# Patient Record
Sex: FEMALE | Race: BLACK OR AFRICAN AMERICAN | Employment: OTHER | URBAN - METROPOLITAN AREA
[De-identification: names, ages, dates, MRNs, and addresses within clinical notes are randomized per-mention and may not be internally consistent; named-entity substitution may affect disease eponyms.]

---

## 2017-08-16 ENCOUNTER — OFFICE VISIT (OUTPATIENT)
Dept: FAMILY MEDICINE CLINIC | Age: 57
End: 2017-08-16

## 2017-08-16 VITALS
WEIGHT: 131.5 LBS | BODY MASS INDEX: 25.82 KG/M2 | SYSTOLIC BLOOD PRESSURE: 110 MMHG | TEMPERATURE: 98.1 F | HEART RATE: 76 BPM | HEIGHT: 60 IN | OXYGEN SATURATION: 98 % | DIASTOLIC BLOOD PRESSURE: 80 MMHG | RESPIRATION RATE: 14 BRPM

## 2017-08-16 DIAGNOSIS — Z12.4 CERVICAL CANCER SCREENING: ICD-10-CM

## 2017-08-16 DIAGNOSIS — Z23 NEED FOR 23-POLYVALENT PNEUMOCOCCAL POLYSACCHARIDE VACCINE: ICD-10-CM

## 2017-08-16 DIAGNOSIS — Z12.11 COLON CANCER SCREENING: ICD-10-CM

## 2017-08-16 DIAGNOSIS — Z13.31 DEPRESSION SCREENING: ICD-10-CM

## 2017-08-16 DIAGNOSIS — Z11.59 NEED FOR HEPATITIS C SCREENING TEST: ICD-10-CM

## 2017-08-16 DIAGNOSIS — E04.1 THYROID NODULE: ICD-10-CM

## 2017-08-16 DIAGNOSIS — E78.5 HYPERLIPIDEMIA, UNSPECIFIED HYPERLIPIDEMIA TYPE: Primary | ICD-10-CM

## 2017-08-16 DIAGNOSIS — Z23 NEED FOR TDAP VACCINATION: ICD-10-CM

## 2017-08-16 DIAGNOSIS — Z12.39 BREAST CANCER SCREENING: ICD-10-CM

## 2017-08-16 DIAGNOSIS — Z13.1 SCREENING FOR DIABETES MELLITUS (DM): ICD-10-CM

## 2017-08-16 PROCEDURE — 99214 OFFICE O/P EST MOD 30 MIN: CPT | Performed by: FAMILY MEDICINE

## 2017-08-16 PROCEDURE — G0444 DEPRESSION SCREEN ANNUAL: HCPCS | Performed by: FAMILY MEDICINE

## 2017-08-16 RX ORDER — ALBUTEROL SULFATE 2.5 MG/3ML
2.5 SOLUTION RESPIRATORY (INHALATION) EVERY 6 HOURS PRN
COMMUNITY
End: 2017-11-06 | Stop reason: SDUPTHER

## 2017-08-16 RX ORDER — SIMVASTATIN 40 MG
40 TABLET ORAL EVERY EVENING
Qty: 30 TABLET | Refills: 3 | Status: SHIPPED | OUTPATIENT
Start: 2017-08-16 | End: 2017-11-06 | Stop reason: SDUPTHER

## 2017-08-16 ASSESSMENT — ENCOUNTER SYMPTOMS
EYE ITCHING: 0
CONSTIPATION: 0
FACIAL SWELLING: 0
EYE DISCHARGE: 0
EYE PAIN: 0
EYE REDNESS: 0
BLOOD IN STOOL: 0
CHOKING: 0
ABDOMINAL PAIN: 0
TROUBLE SWALLOWING: 0
ANAL BLEEDING: 0
RHINORRHEA: 0
PHOTOPHOBIA: 0
WHEEZING: 0
COUGH: 1
ABDOMINAL DISTENTION: 0
CHEST TIGHTNESS: 0
APNEA: 0
NAUSEA: 0
BACK PAIN: 0
DIARRHEA: 0
SINUS PRESSURE: 0
SHORTNESS OF BREATH: 0
COLOR CHANGE: 0

## 2017-08-16 ASSESSMENT — PATIENT HEALTH QUESTIONNAIRE - PHQ9
SUM OF ALL RESPONSES TO PHQ9 QUESTIONS 1 & 2: 0
2. FEELING DOWN, DEPRESSED OR HOPELESS: 0
SUM OF ALL RESPONSES TO PHQ QUESTIONS 1-9: 0
1. LITTLE INTEREST OR PLEASURE IN DOING THINGS: 0

## 2017-08-31 ENCOUNTER — OFFICE VISIT (OUTPATIENT)
Dept: GASTROENTEROLOGY | Age: 57
End: 2017-08-31

## 2017-08-31 VITALS
SYSTOLIC BLOOD PRESSURE: 156 MMHG | HEART RATE: 69 BPM | DIASTOLIC BLOOD PRESSURE: 94 MMHG | WEIGHT: 134 LBS | BODY MASS INDEX: 26.17 KG/M2

## 2017-08-31 DIAGNOSIS — Z86.010 PERSONAL HISTORY OF COLONIC POLYPS: Primary | ICD-10-CM

## 2017-08-31 DIAGNOSIS — Z80.0 FAMILY HISTORY OF COLON CANCER: ICD-10-CM

## 2017-08-31 PROCEDURE — PREOPEXAM PRE-OP EXAM: Performed by: INTERNAL MEDICINE

## 2017-09-27 ENCOUNTER — HOSPITAL ENCOUNTER (OUTPATIENT)
Dept: WOMENS IMAGING | Age: 57
Discharge: HOME | End: 2017-09-27
Payer: MEDICAID

## 2017-09-27 DIAGNOSIS — Z12.39 BREAST CANCER SCREENING: ICD-10-CM

## 2017-09-27 PROCEDURE — G0202 SCR MAMMO BI INCL CAD: HCPCS

## 2017-10-03 ENCOUNTER — OFFICE VISIT (OUTPATIENT)
Dept: SURGERY | Age: 57
End: 2017-10-03

## 2017-10-03 VITALS
DIASTOLIC BLOOD PRESSURE: 84 MMHG | BODY MASS INDEX: 26.31 KG/M2 | WEIGHT: 134 LBS | HEART RATE: 76 BPM | SYSTOLIC BLOOD PRESSURE: 132 MMHG | HEIGHT: 60 IN

## 2017-10-03 DIAGNOSIS — E04.9 GOITER: Primary | ICD-10-CM

## 2017-10-03 PROCEDURE — 99213 OFFICE O/P EST LOW 20 MIN: CPT | Performed by: INTERNAL MEDICINE

## 2017-10-11 NOTE — PROGRESS NOTES
Subjective:      Patient ID: Raul Barcenas is a 65 y.o. female. Other   This is a chronic (goiter /left thyroid nodule ) problem. The current episode started more than 1 year ago. The problem has been unchanged. Nothing aggravates the symptoms. Lab Results   Component Value Date    TSH 0.550 08/28/2016    W0RLFRY 0.98 01/20/2014         Reviewed thyroid u/s   EXAMINATION:  ULTRASOUND THYROID   CLINICAL HISTORY:  FOLLOWUP THYROID NODULE   COMPARISONS:  1/28/15   TECHNIQUE:  Grayscale with duplex Doppler sonography. Sonographic imaging was performed by a registered sonographer and the images are submitted for interpretation. FINDINGS:   The right lobe measures 1.7 x 1.8 x 5.2 cm. There are a few tiny cysts in the right lobe. The left lobe measures 1.5 x 1.5 x 4.6 cm. The isthmus measures 0.35 cm. There is an isoechoic 6 x 9 x 14 mm nodule in the left isthmus. This is considered a low    suspicion nodule according to American Thyroid Association guidelines, and is below the cut off size for FNA. There is no significant change from prior examination.       Impression   STABLE LOW SUSPICION NODULE IN THE LEFT ISTHMUS. NO CHANGE 1/28/15   Reference: 2015 American Thyroid Association Management Guidelines for Adult Patients with Thyroid Nodules and Differentiated Thyroid Cancer: The American Thyroid Associated Guidelines Task Force on Thyroid Nodules and Differentiated Thyroid Cancer   Gemma Asher et al., Thyroid.  Jan 2016, 26(1):1-133     Patient Active Problem List   Diagnosis    Chronic low back pain    Hyperlipidemia    COPD (chronic obstructive pulmonary disease) (HCC)    Insomnia    Vitamin D deficiency    Nephrolithiasis    Colon polyps    Cervicalgia    Anxiety and depression    Thyroid nodule    Lumbar radicular pain    Chronic scapular pain    Cervical spine arthritis (HCC)    Neck pain    Chronic right shoulder pain           Review of Systems   HENT: Negative for trouble swallowing. Endocrine: Negative. Vitals:    10/03/17 1405   BP: 132/84   Site: Left Arm   Position: Sitting   Cuff Size: Medium Adult   Pulse: 76   Weight: 134 lb (60.8 kg)   Height: 5' (1.524 m)       Objective:   Physical Exam   Constitutional: She appears well-developed and well-nourished. HENT:   Head: Normocephalic and atraumatic. Neck: Thyromegaly present. Cardiovascular: Normal rate. Pulmonary/Chest: Effort normal.   Musculoskeletal: Normal range of motion. Neurological: She is alert. Skin: Skin is warm and dry. Psychiatric: She has a normal mood and affect. Assessment:      1.  Goiter  T4, Free    TSH without Reflex    US HEAD NECK SOFT TISSUE THYROID           Plan:       F/u in 6-12 months   Orders Placed This Encounter   Procedures    US HEAD NECK SOFT TISSUE THYROID     Standing Status:   Future     Standing Expiration Date:   10/3/2018     Order Specific Question:   Reason for exam:     Answer:   thyroid nodule    T4, Free     Standing Status:   Future     Standing Expiration Date:   10/3/2018    TSH without Reflex     Standing Status:   Future     Standing Expiration Date:   10/3/2018

## 2017-10-12 ASSESSMENT — ENCOUNTER SYMPTOMS: TROUBLE SWALLOWING: 0

## 2017-11-03 ENCOUNTER — HOSPITAL ENCOUNTER (OUTPATIENT)
Dept: ULTRASOUND IMAGING | Age: 57
Discharge: HOME | End: 2017-11-03
Payer: MEDICAID

## 2017-11-03 DIAGNOSIS — E04.9 GOITER: ICD-10-CM

## 2017-11-03 DIAGNOSIS — Z11.59 NEED FOR HEPATITIS C SCREENING TEST: ICD-10-CM

## 2017-11-03 DIAGNOSIS — Z13.1 SCREENING FOR DIABETES MELLITUS (DM): ICD-10-CM

## 2017-11-03 DIAGNOSIS — E78.5 HYPERLIPIDEMIA, UNSPECIFIED HYPERLIPIDEMIA TYPE: ICD-10-CM

## 2017-11-03 LAB
ALBUMIN SERPL-MCNC: 4.4 G/DL (ref 3.9–4.9)
ALP BLD-CCNC: 91 U/L (ref 40–130)
ALT SERPL-CCNC: 8 U/L (ref 0–33)
ANION GAP SERPL CALCULATED.3IONS-SCNC: 14 MEQ/L (ref 7–13)
AST SERPL-CCNC: 15 U/L (ref 0–35)
ATYPICAL LYMPHOCYTE RELATIVE PERCENT: 4 %
BASOPHILS ABSOLUTE: 0.1 K/UL (ref 0–0.2)
BASOPHILS RELATIVE PERCENT: 1 %
BILIRUB SERPL-MCNC: 0.2 MG/DL (ref 0–1.2)
BUN BLDV-MCNC: 8 MG/DL (ref 6–20)
CALCIUM SERPL-MCNC: 9.3 MG/DL (ref 8.6–10.2)
CHLORIDE BLD-SCNC: 103 MEQ/L (ref 98–107)
CHOLESTEROL, TOTAL: 276 MG/DL (ref 0–199)
CO2: 27 MEQ/L (ref 22–29)
CREAT SERPL-MCNC: 0.58 MG/DL (ref 0.5–0.9)
EOSINOPHILS ABSOLUTE: 0 K/UL (ref 0–0.7)
EOSINOPHILS RELATIVE PERCENT: 0.5 %
GFR AFRICAN AMERICAN: >60
GFR NON-AFRICAN AMERICAN: >60
GLOBULIN: 3 G/DL (ref 2.3–3.5)
GLUCOSE BLD-MCNC: 80 MG/DL (ref 74–109)
HBA1C MFR BLD: 5.8 % (ref 4.8–5.9)
HCT VFR BLD CALC: 42.6 % (ref 37–47)
HDLC SERPL-MCNC: 66 MG/DL (ref 40–59)
HEMOGLOBIN: 13.8 G/DL (ref 12–16)
HEPATITIS C ANTIBODY INTERPRETATION: NORMAL
LDL CHOLESTEROL CALCULATED: 196 MG/DL (ref 0–129)
LYMPHOCYTES ABSOLUTE: 2 K/UL (ref 1–4.8)
LYMPHOCYTES RELATIVE PERCENT: 29 %
MCH RBC QN AUTO: 31 PG (ref 27–31.3)
MCHC RBC AUTO-ENTMCNC: 32.5 % (ref 33–37)
MCV RBC AUTO: 95.4 FL (ref 82–100)
MONOCYTES ABSOLUTE: 0.9 K/UL (ref 0.2–0.8)
MONOCYTES RELATIVE PERCENT: 14.6 %
NEUTROPHILS ABSOLUTE: 3.1 K/UL (ref 1.4–6.5)
NEUTROPHILS RELATIVE PERCENT: 51 %
PDW BLD-RTO: 13.9 % (ref 11.5–14.5)
PLATELET # BLD: 248 K/UL (ref 130–400)
PLATELET SLIDE REVIEW: NORMAL
POTASSIUM SERPL-SCNC: 4.7 MEQ/L (ref 3.5–5.1)
RBC # BLD: 4.46 M/UL (ref 4.2–5.4)
RBC # BLD: NORMAL 10*6/UL
SODIUM BLD-SCNC: 144 MEQ/L (ref 132–144)
T4 FREE: 1.28 NG/DL (ref 0.93–1.7)
TOTAL PROTEIN: 7.4 G/DL (ref 6.4–8.1)
TRIGL SERPL-MCNC: 68 MG/DL (ref 0–200)
TSH SERPL DL<=0.05 MIU/L-ACNC: 0.39 UIU/ML (ref 0.27–4.2)
VACUOLATED NEUTROPHILS: PRESENT
WBC # BLD: 6 K/UL (ref 4.8–10.8)

## 2017-11-03 PROCEDURE — 76536 US EXAM OF HEAD AND NECK: CPT

## 2017-11-06 ENCOUNTER — OFFICE VISIT (OUTPATIENT)
Dept: FAMILY MEDICINE CLINIC | Age: 57
End: 2017-11-06

## 2017-11-06 VITALS
SYSTOLIC BLOOD PRESSURE: 118 MMHG | BODY MASS INDEX: 25.11 KG/M2 | HEIGHT: 61 IN | TEMPERATURE: 98.6 F | WEIGHT: 133 LBS | HEART RATE: 74 BPM | DIASTOLIC BLOOD PRESSURE: 74 MMHG | RESPIRATION RATE: 20 BRPM | OXYGEN SATURATION: 98 %

## 2017-11-06 DIAGNOSIS — Z72.0 TOBACCO ABUSE: ICD-10-CM

## 2017-11-06 DIAGNOSIS — E78.5 HYPERLIPIDEMIA, UNSPECIFIED HYPERLIPIDEMIA TYPE: Primary | ICD-10-CM

## 2017-11-06 DIAGNOSIS — R73.03 PREDIABETES: ICD-10-CM

## 2017-11-06 DIAGNOSIS — J45.909 REACTIVE AIRWAY DISEASE WITHOUT COMPLICATION, UNSPECIFIED ASTHMA SEVERITY, UNSPECIFIED WHETHER PERSISTENT: ICD-10-CM

## 2017-11-06 PROCEDURE — 3014F SCREEN MAMMO DOC REV: CPT | Performed by: FAMILY MEDICINE

## 2017-11-06 PROCEDURE — G8419 CALC BMI OUT NRM PARAM NOF/U: HCPCS | Performed by: FAMILY MEDICINE

## 2017-11-06 PROCEDURE — G8484 FLU IMMUNIZE NO ADMIN: HCPCS | Performed by: FAMILY MEDICINE

## 2017-11-06 PROCEDURE — G8427 DOCREV CUR MEDS BY ELIG CLIN: HCPCS | Performed by: FAMILY MEDICINE

## 2017-11-06 PROCEDURE — 3017F COLORECTAL CA SCREEN DOC REV: CPT | Performed by: FAMILY MEDICINE

## 2017-11-06 PROCEDURE — 99214 OFFICE O/P EST MOD 30 MIN: CPT | Performed by: FAMILY MEDICINE

## 2017-11-06 PROCEDURE — 4004F PT TOBACCO SCREEN RCVD TLK: CPT | Performed by: FAMILY MEDICINE

## 2017-11-06 RX ORDER — ALPRAZOLAM 0.5 MG/1
TABLET ORAL
Refills: 0 | Status: CANCELLED | OUTPATIENT
Start: 2017-11-06

## 2017-11-06 RX ORDER — ALBUTEROL SULFATE 2.5 MG/3ML
2.5 SOLUTION RESPIRATORY (INHALATION) EVERY 6 HOURS PRN
Qty: 60 EACH | Refills: 0 | Status: SHIPPED | OUTPATIENT
Start: 2017-11-06 | End: 2018-07-24 | Stop reason: SDUPTHER

## 2017-11-06 RX ORDER — VARENICLINE TARTRATE 25 MG
KIT ORAL
Qty: 1 EACH | Refills: 0 | Status: SHIPPED | OUTPATIENT
Start: 2017-11-06 | End: 2018-07-11

## 2017-11-06 RX ORDER — SIMVASTATIN 40 MG
40 TABLET ORAL EVERY EVENING
Qty: 30 TABLET | Refills: 3 | Status: SHIPPED | OUTPATIENT
Start: 2017-11-06 | End: 2018-07-11 | Stop reason: SDUPTHER

## 2017-11-06 ASSESSMENT — ENCOUNTER SYMPTOMS
ABDOMINAL PAIN: 0
NAUSEA: 0
DIARRHEA: 0
CONSTIPATION: 0
CHEST TIGHTNESS: 0
APNEA: 0
COUGH: 0
VOMITING: 0
BLOOD IN STOOL: 0
SHORTNESS OF BREATH: 0

## 2018-01-25 ENCOUNTER — TELEPHONE (OUTPATIENT)
Dept: FAMILY MEDICINE CLINIC | Age: 58
End: 2018-01-25

## 2018-01-25 NOTE — TELEPHONE ENCOUNTER
Pt called and wants us to know that she wants to get the lotion from a company that we get a fax from. She has used and gotten from them before.

## 2018-07-11 ENCOUNTER — OFFICE VISIT (OUTPATIENT)
Dept: FAMILY MEDICINE CLINIC | Age: 58
End: 2018-07-11
Payer: MEDICAID

## 2018-07-11 VITALS
TEMPERATURE: 97.7 F | HEART RATE: 73 BPM | WEIGHT: 127.25 LBS | BODY MASS INDEX: 24.02 KG/M2 | DIASTOLIC BLOOD PRESSURE: 72 MMHG | OXYGEN SATURATION: 97 % | HEIGHT: 61 IN | SYSTOLIC BLOOD PRESSURE: 112 MMHG | RESPIRATION RATE: 14 BRPM

## 2018-07-11 DIAGNOSIS — Z12.11 COLON CANCER SCREENING: ICD-10-CM

## 2018-07-11 DIAGNOSIS — E78.5 HYPERLIPIDEMIA, UNSPECIFIED HYPERLIPIDEMIA TYPE: Primary | ICD-10-CM

## 2018-07-11 DIAGNOSIS — J44.9 CHRONIC OBSTRUCTIVE PULMONARY DISEASE, UNSPECIFIED COPD TYPE (HCC): ICD-10-CM

## 2018-07-11 DIAGNOSIS — Z12.4 CERVICAL CANCER SCREENING: ICD-10-CM

## 2018-07-11 DIAGNOSIS — Z23 NEED FOR VACCINATION: ICD-10-CM

## 2018-07-11 DIAGNOSIS — Z12.39 BREAST CANCER SCREENING: ICD-10-CM

## 2018-07-11 PROCEDURE — G8420 CALC BMI NORM PARAMETERS: HCPCS | Performed by: FAMILY MEDICINE

## 2018-07-11 PROCEDURE — G8427 DOCREV CUR MEDS BY ELIG CLIN: HCPCS | Performed by: FAMILY MEDICINE

## 2018-07-11 PROCEDURE — 99213 OFFICE O/P EST LOW 20 MIN: CPT | Performed by: FAMILY MEDICINE

## 2018-07-11 PROCEDURE — 4004F PT TOBACCO SCREEN RCVD TLK: CPT | Performed by: FAMILY MEDICINE

## 2018-07-11 PROCEDURE — G8926 SPIRO NO PERF OR DOC: HCPCS | Performed by: FAMILY MEDICINE

## 2018-07-11 PROCEDURE — 3017F COLORECTAL CA SCREEN DOC REV: CPT | Performed by: FAMILY MEDICINE

## 2018-07-11 PROCEDURE — 3023F SPIROM DOC REV: CPT | Performed by: FAMILY MEDICINE

## 2018-07-11 RX ORDER — SIMVASTATIN 40 MG
40 TABLET ORAL EVERY EVENING
Qty: 30 TABLET | Refills: 5 | Status: SHIPPED | OUTPATIENT
Start: 2018-07-11 | End: 2022-03-09

## 2018-07-15 ASSESSMENT — ENCOUNTER SYMPTOMS
SHORTNESS OF BREATH: 0
RHINORRHEA: 0
ABDOMINAL PAIN: 0
VOMITING: 0
COUGH: 0
SORE THROAT: 0
TROUBLE SWALLOWING: 0
NAUSEA: 0
HEARTBURN: 0
CHEST TIGHTNESS: 0
BLOOD IN STOOL: 0
CONSTIPATION: 0
APNEA: 0
DIARRHEA: 0

## 2018-07-16 NOTE — PROGRESS NOTES
disease, unspecified COPD type ([address]  Due to smoking history likely lung issue is a combination of both asthma and COPD  Continue albuterol as needed  Consider pulmonary function tests at later date    5. Breast cancer screening    - JENNYFER DIGITAL SCREEN W CAD BILATERAL; Future    6. Cervical cancer screening    - Ambulatory referral to Obstetrics / Gynecology      Return in about 3 months (around 10/11/2018).     Ilya Adler MD

## 2018-07-24 DIAGNOSIS — J45.909 REACTIVE AIRWAY DISEASE WITHOUT COMPLICATION, UNSPECIFIED ASTHMA SEVERITY, UNSPECIFIED WHETHER PERSISTENT: ICD-10-CM

## 2018-07-24 RX ORDER — ALBUTEROL SULFATE 2.5 MG/3ML
2.5 SOLUTION RESPIRATORY (INHALATION) EVERY 6 HOURS PRN
Qty: 60 EACH | Refills: 0 | Status: SHIPPED | OUTPATIENT
Start: 2018-07-24 | End: 2018-09-18 | Stop reason: ALTCHOICE

## 2018-07-24 RX ORDER — VARENICLINE TARTRATE 0.5 MG/1
0.5 TABLET, FILM COATED ORAL 2 TIMES DAILY
Qty: 60 TABLET | Refills: 3 | OUTPATIENT
Start: 2018-07-24

## 2018-07-24 NOTE — TELEPHONE ENCOUNTER
Chantix was discontinued at her most recent office visit in the past month. She should discuss this with Dr. Lavone Shone at her next appointment or call again to request refill when he returns to the office.  He would know why the medication was discontinued

## 2018-07-24 NOTE — TELEPHONE ENCOUNTER
Chantix was discontinued at her most recent office visit in the past month. She should discuss this with Dr. Robert Rock at her next appointment.

## 2018-07-25 ENCOUNTER — OFFICE VISIT (OUTPATIENT)
Dept: OBGYN CLINIC | Age: 58
End: 2018-07-25
Payer: MEDICAID

## 2018-07-25 VITALS
HEART RATE: 68 BPM | DIASTOLIC BLOOD PRESSURE: 64 MMHG | WEIGHT: 127 LBS | BODY MASS INDEX: 23.98 KG/M2 | HEIGHT: 61 IN | SYSTOLIC BLOOD PRESSURE: 122 MMHG

## 2018-07-25 DIAGNOSIS — B37.31 VAGINAL YEAST INFECTION: ICD-10-CM

## 2018-07-25 DIAGNOSIS — Z01.419 VISIT FOR GYNECOLOGIC EXAMINATION: Primary | ICD-10-CM

## 2018-07-25 PROCEDURE — 99386 PREV VISIT NEW AGE 40-64: CPT | Performed by: OBSTETRICS & GYNECOLOGY

## 2018-07-25 RX ORDER — FLUCONAZOLE 150 MG/1
TABLET ORAL
Qty: 3 TABLET | Refills: 0 | Status: SHIPPED | OUTPATIENT
Start: 2018-07-25 | End: 2018-09-18 | Stop reason: ALTCHOICE

## 2018-07-25 NOTE — PROGRESS NOTES
and atraumatic. Eyes: Conjunctivae are normal. Pupils are equal, round, and reactive to light. Neck: Normal range of motion. Neck supple. Cardiovascular: Normal rate and regular rhythm. Pulmonary/Chest: Effort normal and breath sounds normal. No respiratory distress. Right breast exhibits no inverted nipple, no mass, no nipple discharge, no skin change and no tenderness. Left breast exhibits no inverted nipple, no mass, no nipple discharge, no skin change and no tenderness. Breasts are symmetrical.   Abdominal: Soft. Bowel sounds are normal.   Genitourinary: Vagina normal, uterus normal and cervix normal. No vaginal discharge found. Musculoskeletal: Normal range of motion. Neurological: She is alert and oriented to person, place, and time. She has normal reflexes. Psychiatric: Memory, affect and judgment normal.       Assessment:      Diagnosis Orders   1. Visit for gynecologic examination  PAP SMEAR    Bacterial Vaginosis Panel    Candida DNA probe   2. Vaginal yeast infection         Body mass index is 24 kg/m². Obesity:  Normal weight  Smoking:  Yes    Plan:   indicated:  performed. Fluconazole 150 mg po every 2 days x 3 doses for possible yeast infection. If symptoms recur to follow for possible need of estrogen vaginal cream .     Obesity Counseling:  N/A  Smoking Counseling:  Given  What are the benefits of quitting smoking? -- Quitting smoking can lower your chances of getting or dying from heart disease, lung disease, kidney failure, infection, or cancer. It can also lower your chances of getting osteoporosis, a condition that makes your bones weak. Plus, quitting smoking can help your skin look younger and reduce the chances that you will have problems with sex. Quitting smoking will improve your health no matter how old you are, and no matter how long or how much you have smoked. The letters in the word \"START\" can help you remember the steps to take:  S = Set a quit date.   T = Tell

## 2018-07-31 LAB — PAP SMEAR: NORMAL

## 2018-08-02 DIAGNOSIS — F17.200 TOBACCO DEPENDENCE: Primary | ICD-10-CM

## 2018-08-02 RX ORDER — VARENICLINE TARTRATE 1 MG/1
1 TABLET, FILM COATED ORAL 2 TIMES DAILY
Qty: 60 TABLET | Refills: 3 | Status: SHIPPED | OUTPATIENT
Start: 2018-08-02 | End: 2018-09-18 | Stop reason: ALTCHOICE

## 2018-08-03 ENCOUNTER — HOSPITAL ENCOUNTER (OUTPATIENT)
Dept: WOMENS IMAGING | Age: 58
Discharge: HOME | End: 2018-08-05
Payer: MEDICAID

## 2018-08-03 DIAGNOSIS — Z12.39 BREAST CANCER SCREENING: ICD-10-CM

## 2018-08-08 ENCOUNTER — HOSPITAL ENCOUNTER (EMERGENCY)
Age: 58
Discharge: HOME | End: 2018-08-08
Payer: MEDICAID

## 2018-08-08 ENCOUNTER — APPOINTMENT (OUTPATIENT)
Dept: CT IMAGING | Age: 58
End: 2018-08-08
Payer: MEDICAID

## 2018-08-08 VITALS
DIASTOLIC BLOOD PRESSURE: 93 MMHG | BODY MASS INDEX: 24.74 KG/M2 | SYSTOLIC BLOOD PRESSURE: 132 MMHG | OXYGEN SATURATION: 100 % | HEIGHT: 60 IN | TEMPERATURE: 97.6 F | RESPIRATION RATE: 18 BRPM | HEART RATE: 65 BPM | WEIGHT: 126 LBS

## 2018-08-08 DIAGNOSIS — I95.1 ORTHOSTATIC HYPOTENSION: Primary | ICD-10-CM

## 2018-08-08 DIAGNOSIS — E86.0 DEHYDRATION: ICD-10-CM

## 2018-08-08 LAB
ALBUMIN SERPL-MCNC: 3.6 G/DL (ref 3.9–4.9)
ALP BLD-CCNC: 88 U/L (ref 40–130)
ALT SERPL-CCNC: 10 U/L (ref 0–33)
AMPHETAMINE SCREEN, URINE: ABNORMAL
ANION GAP SERPL CALCULATED.3IONS-SCNC: 13 MEQ/L (ref 7–13)
AST SERPL-CCNC: 18 U/L (ref 0–35)
BARBITURATE SCREEN URINE: ABNORMAL
BASOPHILS ABSOLUTE: 0 K/UL (ref 0–0.2)
BASOPHILS RELATIVE PERCENT: 0.4 %
BENZODIAZEPINE SCREEN, URINE: ABNORMAL
BILIRUB SERPL-MCNC: <0.2 MG/DL (ref 0–1.2)
BILIRUBIN URINE: NEGATIVE
BLOOD, URINE: NEGATIVE
BUN BLDV-MCNC: 10 MG/DL (ref 6–20)
CALCIUM SERPL-MCNC: 9.2 MG/DL (ref 8.6–10.2)
CANNABINOID SCREEN URINE: POSITIVE
CHLORIDE BLD-SCNC: 103 MEQ/L (ref 98–107)
CLARITY: CLEAR
CO2: 24 MEQ/L (ref 22–29)
COCAINE METABOLITE SCREEN URINE: ABNORMAL
COLOR: YELLOW
CREAT SERPL-MCNC: 0.76 MG/DL (ref 0.5–0.9)
EOSINOPHILS ABSOLUTE: 0 K/UL (ref 0–0.7)
EOSINOPHILS RELATIVE PERCENT: 0.5 %
ETHANOL PERCENT: NORMAL G/DL
ETHANOL: <10 MG/DL (ref 0–0.08)
GFR AFRICAN AMERICAN: >60
GFR NON-AFRICAN AMERICAN: >60
GLOBULIN: 3.5 G/DL (ref 2.3–3.5)
GLUCOSE BLD-MCNC: 105 MG/DL (ref 74–109)
GLUCOSE URINE: NEGATIVE MG/DL
HCT VFR BLD CALC: 42.6 % (ref 37–47)
HEMOGLOBIN: 14.6 G/DL (ref 12–16)
KETONES, URINE: NEGATIVE MG/DL
LEUKOCYTE ESTERASE, URINE: NEGATIVE
LYMPHOCYTES ABSOLUTE: 2.3 K/UL (ref 1–4.8)
LYMPHOCYTES RELATIVE PERCENT: 30.1 %
Lab: ABNORMAL
MCH RBC QN AUTO: 32.6 PG (ref 27–31.3)
MCHC RBC AUTO-ENTMCNC: 34.3 % (ref 33–37)
MCV RBC AUTO: 95.2 FL (ref 82–100)
MONOCYTES ABSOLUTE: 0.5 K/UL (ref 0.2–0.8)
MONOCYTES RELATIVE PERCENT: 6.1 %
NEUTROPHILS ABSOLUTE: 4.8 K/UL (ref 1.4–6.5)
NEUTROPHILS RELATIVE PERCENT: 62.9 %
NITRITE, URINE: NEGATIVE
OPIATE SCREEN URINE: ABNORMAL
PDW BLD-RTO: 13.6 % (ref 11.5–14.5)
PH UA: 7 (ref 5–9)
PHENCYCLIDINE SCREEN URINE: ABNORMAL
PLATELET # BLD: 217 K/UL (ref 130–400)
POTASSIUM SERPL-SCNC: 4.4 MEQ/L (ref 3.5–5.1)
PROTEIN UA: NEGATIVE MG/DL
RBC # BLD: 4.48 M/UL (ref 4.2–5.4)
SODIUM BLD-SCNC: 140 MEQ/L (ref 132–144)
SPECIFIC GRAVITY UA: 1 (ref 1–1.03)
TOTAL PROTEIN: 7.1 G/DL (ref 6.4–8.1)
TSH SERPL DL<=0.05 MIU/L-ACNC: 1.52 UIU/ML (ref 0.27–4.2)
URINE REFLEX TO CULTURE: NORMAL
UROBILINOGEN, URINE: 0.2 E.U./DL
WBC # BLD: 7.6 K/UL (ref 4.8–10.8)

## 2018-08-08 PROCEDURE — 70450 CT HEAD/BRAIN W/O DYE: CPT

## 2018-08-08 PROCEDURE — 81003 URINALYSIS AUTO W/O SCOPE: CPT

## 2018-08-08 PROCEDURE — 36415 COLL VENOUS BLD VENIPUNCTURE: CPT

## 2018-08-08 PROCEDURE — 96361 HYDRATE IV INFUSION ADD-ON: CPT

## 2018-08-08 PROCEDURE — 99284 EMERGENCY DEPT VISIT MOD MDM: CPT

## 2018-08-08 PROCEDURE — G0480 DRUG TEST DEF 1-7 CLASSES: HCPCS

## 2018-08-08 PROCEDURE — 96360 HYDRATION IV INFUSION INIT: CPT

## 2018-08-08 PROCEDURE — 85025 COMPLETE CBC W/AUTO DIFF WBC: CPT

## 2018-08-08 PROCEDURE — 2580000003 HC RX 258: Performed by: PHYSICIAN ASSISTANT

## 2018-08-08 PROCEDURE — 84443 ASSAY THYROID STIM HORMONE: CPT

## 2018-08-08 PROCEDURE — 80307 DRUG TEST PRSMV CHEM ANLYZR: CPT

## 2018-08-08 PROCEDURE — 80053 COMPREHEN METABOLIC PANEL: CPT

## 2018-08-08 RX ORDER — 0.9 % SODIUM CHLORIDE 0.9 %
1000 INTRAVENOUS SOLUTION INTRAVENOUS ONCE
Status: COMPLETED | OUTPATIENT
Start: 2018-08-08 | End: 2018-08-08

## 2018-08-08 RX ORDER — 0.9 % SODIUM CHLORIDE 0.9 %
1000 INTRAVENOUS SOLUTION INTRAVENOUS ONCE
Status: DISCONTINUED | OUTPATIENT
Start: 2018-08-08 | End: 2018-08-08 | Stop reason: HOSPADM

## 2018-08-08 RX ADMIN — SODIUM CHLORIDE 1000 ML: 9 INJECTION, SOLUTION INTRAVENOUS at 08:56

## 2018-08-08 RX ADMIN — SODIUM CHLORIDE 1000 ML: 9 INJECTION, SOLUTION INTRAVENOUS at 07:09

## 2018-08-08 ASSESSMENT — ENCOUNTER SYMPTOMS
CONSTIPATION: 0
ABDOMINAL PAIN: 0
RHINORRHEA: 0
COLOR CHANGE: 0
ABDOMINAL DISTENTION: 0
SORE THROAT: 0
EYE DISCHARGE: 0
SHORTNESS OF BREATH: 0

## 2018-08-08 NOTE — ED NOTES
Pt given food and drink and is sitting up in bed eating with no distress noted.       Cailin Shea RN  08/08/18 7246

## 2018-08-08 NOTE — ED PROVIDER NOTES
review of systems was reviewed and negative. PAST MEDICAL HISTORY     Past Medical History:   Diagnosis Date    Anxiety     Cervicalgia     Chronic low back pain     Chronic low back pain     Chronic scapular pain     Colon polyps     Colon polyps     COPD (chronic obstructive pulmonary disease) (HCC)     Hyperlipidemia     Insomnia     Insomnia     Lumbar radicular pain     Major depression     Nephrolithiasis     Nephrolithiasis     Osteoarthritis     Thyroid nodule     Vitamin D deficiency          SURGICAL HISTORY       Past Surgical History:   Procedure Laterality Date     SECTION      COLONOSCOPY  2006    NATY HUNT MD    HYSTERECTOMY           CURRENT MEDICATIONS       Previous Medications    ALBUTEROL (PROVENTIL) (2.5 MG/3ML) 0.083% NEBULIZER SOLUTION    Take 3 mLs by nebulization every 6 hours as needed for Wheezing    FLUCONAZOLE (DIFLUCAN) 150 MG TABLET    Take 1 tablet every 2 days for 3 doses. SIMVASTATIN (ZOCOR) 40 MG TABLET    Take 1 tablet by mouth every evening    VARENICLINE (CHANTIX CONTINUING MONTH MALIKA) 1 MG TABLET    Take 1 tablet by mouth 2 times daily       ALLERGIES     Patient has no known allergies.     FAMILY HISTORY       Family History   Problem Relation Age of Onset    Depression Mother     Heart Disease Mother     Cancer Father         prostate cancer    Cancer Paternal Grandfather         prostate cancer    Cancer Maternal Cousin         breast cancer          SOCIAL HISTORY       Social History     Social History    Marital status: Single     Spouse name: N/A    Number of children: N/A    Years of education: N/A     Social History Main Topics    Smoking status: Current Every Day Smoker     Packs/day: 0.50     Years: 30.00     Types: Cigarettes    Smokeless tobacco: Never Used      Comment: trying to cut back    Alcohol use Yes      Comment: ocassional    Drug use: No    Sexual activity: Not Currently     Other Topics Concern    None     Social History Narrative    None       SCREENINGS             PHYSICAL EXAM    (up to 7 for level 4, 8 or more for level 5)     ED Triage Vitals [08/08/18 0636]   BP Temp Temp Source Pulse Resp SpO2 Height Weight   122/78 97.6 °F (36.4 °C) Oral 65 20 100 % 5' (1.524 m) 126 lb (57.2 kg)       Physical Exam   Constitutional: She is oriented to person, place, and time. She appears well-developed and well-nourished. HENT:   Head: Normocephalic. Eyes: Pupils are equal, round, and reactive to light. Neck: Neck supple. No JVD present. No tracheal deviation present. Cardiovascular: Normal rate. Pulmonary/Chest: Effort normal. No respiratory distress. She has no wheezes. She has no rales. She exhibits no tenderness. Abdominal: Soft. She exhibits no distension and no mass. There is no tenderness. There is no guarding. Musculoskeletal: She exhibits no edema or deformity. Neurological: She is alert and oriented to person, place, and time. Coordination normal.   Patient is able to stand and ambulate without any dizziness, no ataxia, but when I lay her flat she states that she feels like the room is spinning. No speech and facial droop and no weakness upper or lower extremities. Skin: Skin is warm. DIAGNOSTIC RESULTS     EKG: All EKG's are interpreted by the Emergency Department Physician who either signs or Co-signs this chart in the absence of a cardiologist.        RADIOLOGY:   Non-plain film images such as CT, Ultrasound and MRI are read by the radiologist. Plain radiographic images are visualized and preliminarily interpreted by the emergency physician with the below findings:    CT brain shows no acute intracranial process. Interpretation per the Radiologist below, if available at the time of this note:    CT Head WO Contrast   Final Result      No acute intracranial process.             ED BEDSIDE ULTRASOUND:   Performed by ED Physician - none    LABS:  Labs Reviewed   COMPREHENSIVE METABOLIC PANEL - Abnormal; Notable for the following:        Result Value    Alb 3.6 (*)     All other components within normal limits   CBC WITH AUTO DIFFERENTIAL - Abnormal; Notable for the following:     MCH 32.6 (*)     All other components within normal limits   URINE DRUG SCREEN - Abnormal; Notable for the following:     Cannabinoid Scrn, Ur POSITIVE (*)     All other components within normal limits   TSH WITHOUT REFLEX   ETHANOL   URINE RT REFLEX TO CULTURE       All other labs were within normal range or not returned as of this dictation. EMERGENCY DEPARTMENT COURSE and DIFFERENTIAL DIAGNOSIS/MDM:   Vitals:    Vitals:    08/08/18 0636 08/08/18 0744 08/08/18 0948 08/08/18 1017   BP: 122/78  132/88 (!) 132/93   Pulse: 65  56 65   Resp: 20 18 18    Temp: 97.6 °F (36.4 °C)      TempSrc: Oral      SpO2: 100% 100% 100%    Weight: 126 lb (57.2 kg)      Height: 5' (1.524 m)               MDM  Number of Diagnoses or Management Options  Dehydration:   Orthostatic hypotension:   Diagnosis management comments: Pt blood pressure was initially positive during orthostatic testing, pt was hydrated with 2 liters of NS, and orthostatics repeated and where improved. Pt feeling much better and advised to increase oral fluid intake. Pt advised if she has return of symptoms or no improvement to return to the ER. CRITICAL CARE TIME   Total Critical Care time was 0 minutes, excluding separately reportable procedures. There was a high probability of clinically significant/life threatening deterioration in the patient's condition which required my urgent intervention. CONSULTS:  None    PROCEDURES:  Unless otherwise noted below, none     Procedures    FINAL IMPRESSION      1. Orthostatic hypotension    2.  Dehydration          DISPOSITION/PLAN   DISPOSITION Discharge - Pending Orders Complete 08/08/2018 08:47:32 AM      PATIENT REFERRED TO:  Urbano Jean MD  [address] [place] [address],[address] [place] [address] Road  [phone]    In 2 days        DISCHARGE MEDICATIONS:  New Prescriptions    No medications on file          (Please note that portions of this note were completed with a voice recognition program.  Efforts were made to edit the dictations but occasionally words are mis-transcribed.)    Josefina Ayala PA-C (electronically signed)  Attending Emergency Physician         Josefina Ayala PA-C  08/08/18 1038

## 2018-08-24 PROBLEM — Z01.419 VISIT FOR GYNECOLOGIC EXAMINATION: Status: RESOLVED | Noted: 2018-07-25 | Resolved: 2018-08-24

## 2018-09-12 ENCOUNTER — HOSPITAL ENCOUNTER (EMERGENCY)
Age: 58
Discharge: HOME | End: 2018-09-12
Attending: EMERGENCY MEDICINE
Payer: MEDICAID

## 2018-09-12 VITALS
RESPIRATION RATE: 18 BRPM | WEIGHT: 132 LBS | TEMPERATURE: 97.9 F | HEIGHT: 64 IN | OXYGEN SATURATION: 99 % | BODY MASS INDEX: 22.53 KG/M2 | SYSTOLIC BLOOD PRESSURE: 113 MMHG | HEART RATE: 74 BPM | DIASTOLIC BLOOD PRESSURE: 79 MMHG

## 2018-09-12 DIAGNOSIS — J30.2 SEASONAL ALLERGIES: Primary | ICD-10-CM

## 2018-09-12 PROCEDURE — 99282 EMERGENCY DEPT VISIT SF MDM: CPT

## 2018-09-12 RX ORDER — DIPHENHYDRAMINE HCL 25 MG
25 CAPSULE ORAL
Qty: 30 CAPSULE | Refills: 0 | Status: SHIPPED | OUTPATIENT
Start: 2018-09-12 | End: 2018-09-18 | Stop reason: ALTCHOICE

## 2018-09-12 RX ORDER — CETIRIZINE HYDROCHLORIDE 10 MG/1
10 TABLET ORAL DAILY
Qty: 30 TABLET | Refills: 0 | Status: SHIPPED | OUTPATIENT
Start: 2018-09-12 | End: 2018-09-18 | Stop reason: ALTCHOICE

## 2018-09-12 ASSESSMENT — ENCOUNTER SYMPTOMS
GASTROINTESTINAL NEGATIVE: 1
EYES NEGATIVE: 1
RESPIRATORY NEGATIVE: 1

## 2018-09-12 NOTE — ED PROVIDER NOTES
List as of 9/12/2018 12:46 PM      CONTINUE these medications which have NOT CHANGED    Details   varenicline (CHANTIX CONTINUING MONTH MALIKA) 1 MG tablet Take 1 tablet by mouth 2 times daily, Disp-60 tablet, R-3Normal      fluconazole (DIFLUCAN) 150 MG tablet Take 1 tablet every 2 days for 3 doses. , Disp-3 tablet, R-0Normal      albuterol (PROVENTIL) (2.5 MG/3ML) 0.083% nebulizer solution Take 3 mLs by nebulization every 6 hours as needed for Wheezing, Disp-60 each, R-0Normal      simvastatin (ZOCOR) 40 MG tablet Take 1 tablet by mouth every evening, Disp-30 tablet, R-5Normal             ALLERGIES     Patient has no known allergies. FAMILY HISTORY       Family History   Problem Relation Age of Onset    Depression Mother     Heart Disease Mother     Cancer Father         prostate cancer    Cancer Paternal Grandfather         prostate cancer    Cancer Maternal Cousin         breast cancer          SOCIAL HISTORY       Social History     Social History    Marital status: Single     Spouse name: N/A    Number of children: N/A    Years of education: N/A     Social History Main Topics    Smoking status: Current Every Day Smoker     Packs/day: 0.50     Years: 30.00     Types: Cigarettes    Smokeless tobacco: Never Used      Comment: trying to cut back    Alcohol use Yes      Comment: ocassional    Drug use: No    Sexual activity: Not Currently     Other Topics Concern    None     Social History Narrative    None       SCREENINGS             PHYSICAL EXAM    (up to 7 for level 4, 8 or more for level 5)     ED Triage Vitals [09/12/18 1229]   BP Temp Temp Source Pulse Resp SpO2 Height Weight   113/79 97.9 °F (36.6 °C) Oral 74 18 99 % 5' 4\" (1.626 m) 132 lb (59.9 kg)       Physical Exam   Constitutional: She is oriented to person, place, and time. She appears well-developed and well-nourished. No distress. HENT:   Head: Normocephalic and atraumatic. Eyes: Pupils are equal, round, and reactive to light.

## 2018-09-12 NOTE — ED TRIAGE NOTES
Pt c/o facial edema in the morning for the past 4 days, Pt states rhe edema subsides as the day goes on, no edema or redness noted at this time, Pt is A&OX3, calm, ambulatory, afebrile, breathes are equal and unlabored, denies pain, SOB, and dizziness.

## 2018-09-18 ENCOUNTER — OFFICE VISIT (OUTPATIENT)
Dept: FAMILY MEDICINE CLINIC | Age: 58
End: 2018-09-18
Payer: MEDICAID

## 2018-09-18 VITALS
HEIGHT: 60 IN | OXYGEN SATURATION: 99 % | TEMPERATURE: 99.5 F | BODY MASS INDEX: 24.15 KG/M2 | WEIGHT: 123 LBS | DIASTOLIC BLOOD PRESSURE: 72 MMHG | SYSTOLIC BLOOD PRESSURE: 136 MMHG | HEART RATE: 93 BPM | RESPIRATION RATE: 12 BRPM

## 2018-09-18 DIAGNOSIS — R68.89 FLU-LIKE SYMPTOMS: Primary | ICD-10-CM

## 2018-09-18 LAB
INFLUENZA A ANTIBODY: NORMAL
INFLUENZA B ANTIBODY: NORMAL

## 2018-09-18 PROCEDURE — G8427 DOCREV CUR MEDS BY ELIG CLIN: HCPCS | Performed by: FAMILY MEDICINE

## 2018-09-18 PROCEDURE — G8420 CALC BMI NORM PARAMETERS: HCPCS | Performed by: FAMILY MEDICINE

## 2018-09-18 PROCEDURE — 3017F COLORECTAL CA SCREEN DOC REV: CPT | Performed by: FAMILY MEDICINE

## 2018-09-18 PROCEDURE — 87804 INFLUENZA ASSAY W/OPTIC: CPT | Performed by: FAMILY MEDICINE

## 2018-09-18 PROCEDURE — 4004F PT TOBACCO SCREEN RCVD TLK: CPT | Performed by: FAMILY MEDICINE

## 2018-09-18 PROCEDURE — 99214 OFFICE O/P EST MOD 30 MIN: CPT | Performed by: FAMILY MEDICINE

## 2018-09-18 RX ORDER — AZITHROMYCIN 250 MG/1
TABLET, FILM COATED ORAL
Qty: 6 TABLET | Refills: 0 | Status: SHIPPED | OUTPATIENT
Start: 2018-09-18 | End: 2018-09-19

## 2018-09-18 ASSESSMENT — PATIENT HEALTH QUESTIONNAIRE - PHQ9
1. LITTLE INTEREST OR PLEASURE IN DOING THINGS: 0
SUM OF ALL RESPONSES TO PHQ QUESTIONS 1-9: 0
SUM OF ALL RESPONSES TO PHQ QUESTIONS 1-9: 0
SUM OF ALL RESPONSES TO PHQ9 QUESTIONS 1 & 2: 0
2. FEELING DOWN, DEPRESSED OR HOPELESS: 0

## 2018-09-19 ENCOUNTER — HOSPITAL ENCOUNTER (OUTPATIENT)
Dept: GENERAL RADIOLOGY | Age: 58
Discharge: HOME | End: 2018-09-21
Payer: MEDICAID

## 2018-09-19 ENCOUNTER — TELEPHONE (OUTPATIENT)
Dept: FAMILY MEDICINE CLINIC | Age: 58
End: 2018-09-19

## 2018-09-19 DIAGNOSIS — R68.89 FLU-LIKE SYMPTOMS: ICD-10-CM

## 2018-09-19 PROCEDURE — 71046 X-RAY EXAM CHEST 2 VIEWS: CPT

## 2018-09-19 RX ORDER — LEVOFLOXACIN 750 MG/1
750 TABLET ORAL DAILY
Qty: 5 TABLET | Refills: 0 | Status: SHIPPED | OUTPATIENT
Start: 2018-09-19 | End: 2018-09-24

## 2018-09-19 NOTE — TELEPHONE ENCOUNTER
[address] from radiology wanted you to look at the chest x-ray that was done today I see that you have already seen the results. cp

## 2018-09-23 ASSESSMENT — ENCOUNTER SYMPTOMS
APNEA: 0
SINUS PAIN: 0
SHORTNESS OF BREATH: 0
SORE THROAT: 1
CHEST TIGHTNESS: 0
WHEEZING: 0
CONSTIPATION: 0
ABDOMINAL PAIN: 1
VOMITING: 0
NAUSEA: 0
COUGH: 1
BLOOD IN STOOL: 0
RHINORRHEA: 0
SWOLLEN GLANDS: 0
DIARRHEA: 1

## 2018-09-23 NOTE — PROGRESS NOTES
Packs/day: 0.50     Years: 30.00     Types: Cigarettes    Smokeless tobacco: Never Used      Comment: trying to cut back    Alcohol use Yes      Comment: ocassional    Drug use: No    Sexual activity: Not Currently     Other Topics Concern    Not on file     Social History Narrative    No narrative on file     Current Outpatient Prescriptions on File Prior to Visit   Medication Sig Dispense Refill    simvastatin (ZOCOR) 40 MG tablet Take 1 tablet by mouth every evening 30 tablet 5     No current facility-administered medications on file prior to visit. Allergies:  Patient has no known allergies. Review of Systems   Constitutional: Positive for fatigue. Negative for activity change, appetite change and fever. HENT: Positive for congestion and sore throat. Negative for ear pain, rhinorrhea, sinus pain and sneezing. Respiratory: Positive for cough (Productive of clear sputum). Negative for apnea, chest tightness, shortness of breath and wheezing. Cardiovascular: Negative for chest pain, palpitations and leg swelling. Gastrointestinal: Positive for abdominal pain and diarrhea. Negative for blood in stool, constipation, nausea and vomiting. Genitourinary: Negative for dysuria. Musculoskeletal: Negative for arthralgias, joint pain and neck pain. Skin: Negative for rash. Neurological: Positive for headaches. Negative for seizures. Psychiatric/Behavioral: Negative for hallucinations and suicidal ideas. Objective:   /72 (Site: Right Upper Arm, Position: Sitting, Cuff Size: Medium Adult)   Pulse 93   Temp 99.5 °F (37.5 °C) (Temporal)   Resp 12   Ht 5' (1.524 m)   Wt 123 lb (55.8 kg)   LMP  (LMP Unknown)   SpO2 99%   Breastfeeding? No   BMI 24.02 kg/m²     Physical Exam   Constitutional: She is oriented to person, place, and time. She appears well-developed and well-nourished. No distress. HENT:   Head: Normocephalic and atraumatic.    Mouth/Throat: Oropharynx is clear

## 2020-10-18 ENCOUNTER — TELEPHONE (OUTPATIENT)
Dept: FAMILY MEDICINE CLINIC | Age: 60
End: 2020-10-18

## 2020-10-18 NOTE — TELEPHONE ENCOUNTER
Called patient to schedule mammogram and appointment.  She is no longer Dr. Kristel Garland patient, she goes to [facility]

## 2022-02-18 ENCOUNTER — HOSPITAL ENCOUNTER (EMERGENCY)
Age: 62
Discharge: HOME | End: 2022-02-18
Payer: MEDICARE

## 2022-02-18 VITALS
WEIGHT: 133 LBS | RESPIRATION RATE: 16 BRPM | SYSTOLIC BLOOD PRESSURE: 164 MMHG | TEMPERATURE: 98.7 F | BODY MASS INDEX: 26.11 KG/M2 | HEART RATE: 68 BPM | HEIGHT: 60 IN | OXYGEN SATURATION: 97 % | DIASTOLIC BLOOD PRESSURE: 81 MMHG

## 2022-02-18 DIAGNOSIS — R31.9 URINARY TRACT INFECTION WITH HEMATURIA, SITE UNSPECIFIED: Primary | ICD-10-CM

## 2022-02-18 DIAGNOSIS — N39.0 URINARY TRACT INFECTION WITH HEMATURIA, SITE UNSPECIFIED: Primary | ICD-10-CM

## 2022-02-18 LAB
BACTERIA: ABNORMAL /HPF
BILIRUBIN URINE: NEGATIVE
BLOOD, URINE: ABNORMAL
CLARITY: ABNORMAL
COLOR: YELLOW
CRYSTALS, UA: ABNORMAL /HPF
EPITHELIAL CELLS, UA: ABNORMAL /HPF (ref 0–5)
GLUCOSE URINE: NEGATIVE MG/DL
HYALINE CASTS: ABNORMAL /HPF (ref 0–5)
KETONES, URINE: ABNORMAL MG/DL
LEUKOCYTE ESTERASE, URINE: NEGATIVE
NITRITE, URINE: NEGATIVE
PH UA: 6.5 (ref 5–9)
PROTEIN UA: 30 MG/DL
RBC UA: >100 /HPF (ref 0–5)
SPECIFIC GRAVITY UA: 1.02 (ref 1–1.03)
URINE REFLEX TO CULTURE: ABNORMAL
UROBILINOGEN, URINE: 1 E.U./DL
WBC UA: ABNORMAL /HPF (ref 0–5)

## 2022-02-18 PROCEDURE — 81003 URINALYSIS AUTO W/O SCOPE: CPT

## 2022-02-18 PROCEDURE — 99283 EMERGENCY DEPT VISIT LOW MDM: CPT

## 2022-02-18 RX ORDER — CEPHALEXIN 500 MG/1
500 CAPSULE ORAL 4 TIMES DAILY
Qty: 40 CAPSULE | Refills: 0 | Status: SHIPPED | OUTPATIENT
Start: 2022-02-18 | End: 2022-02-28

## 2022-02-18 ASSESSMENT — ENCOUNTER SYMPTOMS
CHEST TIGHTNESS: 0
VOMITING: 0
COUGH: 0
ABDOMINAL PAIN: 0
SHORTNESS OF BREATH: 0
TROUBLE SWALLOWING: 0
BACK PAIN: 0

## 2022-02-18 NOTE — ED PROVIDER NOTES
[address] [facility] ED  eMERGENCY dEPARTMENT eNCOUnter      Pt Name: Freida Granger  MRN: [id number]  [place] 1960  Date of evaluation: 2/18/2022  Provider: Anthony Crook PA-C    [address] [place] [address]       Chief Complaint   Patient presents with    Hematuria     pink          HISTORY OF PRESENT ILLNESS   (Location/Symptom, Timing/Onset,Context/Setting, Quality, Duration, Modifying Factors, Severity)  Note limiting factors. Freida Granger is a 62 y.o. female who presents to the emergency department       36-year-old female 1 week ago developed some urinary frequency and intermittent urgency. Did not think much of it. Urine was not cloudy. Today at times was pink and blood-tinged. No fever or chills no back pain no abdominal pain or pressure. No chest pain or shortness of breath. Nothing making symptoms better or worse, she does not take blood thinners no other associated symptoms or complaints. NursingNotes were reviewed. REVIEW OF SYSTEMS    (2-9 systems for level 4, 10 or more for level 5)     Review of Systems   Constitutional: Negative for fever. HENT: Negative for trouble swallowing. Eyes: Negative for visual disturbance. Respiratory: Negative for cough, chest tightness and shortness of breath. Cardiovascular: Negative for chest pain. Gastrointestinal: Negative for abdominal pain and vomiting. Genitourinary: Positive for frequency and hematuria. Musculoskeletal: Negative for back pain. Skin: Negative for rash. Neurological: Negative for headaches. Psychiatric/Behavioral: Negative for behavioral problems. Except as noted above the remainder of the review of systems was reviewed and negative.        PAST MEDICAL HISTORY     Past Medical History:   Diagnosis Date    Anxiety     Cervicalgia     Chronic low back pain     Chronic low back pain     Chronic scapular pain     Colon polyps     Colon polyps     COPD (chronic obstructive pulmonary disease) ([facility] [address]     Hyperlipidemia     Insomnia     Insomnia     Lumbar radicular pain     Major depression     Nephrolithiasis     Nephrolithiasis     Osteoarthritis     Thyroid nodule     Vitamin D deficiency          SURGICALHISTORY       Past Surgical History:   Procedure Laterality Date     SECTION      COLONOSCOPY  2006    NATY HUNT MD    HYSTERECTOMY           CURRENT MEDICATIONS       Previous Medications    SIMVASTATIN (ZOCOR) 40 MG TABLET    Take 1 tablet by mouth every evening       ALLERGIES     Patient has no known allergies. FAMILY HISTORY       Family History   Problem Relation Age of Onset    Depression Mother     Heart Disease Mother     Cancer Father         prostate cancer    Cancer Paternal Grandfather         prostate cancer    Cancer Maternal Cousin         breast cancer          SOCIAL HISTORY       Social History     Socioeconomic History    Marital status: Single     Spouse name: None    Number of children: None    Years of education: None    Highest education level: None   Occupational History    None   Tobacco Use    Smoking status: Current Every Day Smoker     Packs/day: 0.50     Years: 30.00     Pack years: 15.00     Types: Cigarettes    Smokeless tobacco: Never Used    Tobacco comment: trying to cut back   Vaping Use    Vaping Use: Never used   Substance and Sexual Activity    Alcohol use: Yes     Comment: ocassional    Drug use: No    Sexual activity: Not Currently   Other Topics Concern    None   Social History Narrative    None     Social Determinants of Health     Financial Resource Strain:     Difficulty of Paying Living Expenses: Not on file   Food Insecurity:     Worried About Running Out of Food in the Last Year: Not on file    [place] of Food in the Last Year: Not on file   Transportation Needs:     Lack of Transportation (Medical): Not on file    Lack of Transportation (Non-Medical):  Not on file   Physical Activity:     Days of Exercise per Week: Not on file    Minutes of Exercise per Session: Not on file   Stress:     Feeling of Stress : Not on file   Social Connections:     Frequency of Communication with Friends and Family: Not on file    Frequency of Social Gatherings with Friends and Family: Not on file    Attends Yazidi Services: Not on file    Active Member of [address] [place] [address] [facility] or Organizations: Not on file    Attends Club or Organization Meetings: Not on file    Marital Status: Not on file   Intimate Partner Violence:     Fear of Current or Ex-Partner: Not on file    Emotionally Abused: Not on file    Physically Abused: Not on file    Sexually Abused: Not on file   Housing Stability:     Unable to Pay for Housing in the Last Year: Not on file    Number of [place] in the Last Year: Not on file    Unstable Housing in the Last Year: Not on file       SCREENINGS      @FLOW([id number])@      PHYSICAL EXAM    (up to 7 for level 4, 8 or more for level 5)     ED Triage Vitals [02/18/22 1732]   BP Temp Temp src Pulse Resp SpO2 Height Weight   (!) 164/81 98.7 °F (37.1 °C) -- 68 16 97 % 5' (1.524 m) 133 lb (60.3 kg)       Physical Exam  Vitals and nursing note reviewed. Constitutional:       Appearance: Normal appearance. She is normal weight. HENT:      Head: Normocephalic and atraumatic. Eyes:      Extraocular Movements: Extraocular movements intact. Pupils: Pupils are equal, round, and reactive to light. Cardiovascular:      Rate and Rhythm: Normal rate and regular rhythm. Heart sounds: No murmur heard. Pulmonary:      Effort: Pulmonary effort is normal.      Breath sounds: Normal breath sounds. No wheezing. Abdominal:      General: Abdomen is flat. Bowel sounds are normal.      Palpations: Abdomen is soft. There is no mass. Tenderness: There is no abdominal tenderness. There is no right CVA tenderness, left CVA tenderness, guarding or rebound. Hernia: No hernia is present.    Musculoskeletal: General: Normal range of motion. Cervical back: Normal range of motion and neck supple. No rigidity. Skin:     General: Skin is warm and dry. Capillary Refill: Capillary refill takes less than 2 seconds. Neurological:      General: No focal deficit present. Mental Status: She is alert and oriented to person, place, and time. Psychiatric:         Mood and Affect: Mood normal.         DIAGNOSTIC RESULTS     EKG: All EKG's are interpreted by the Emergency Department Physician who either signs or Co-signsthis chart in the absence of a cardiologist.         RADIOLOGY:   Erwin Blotter such as CT, Ultrasound and MRI are read by the radiologist. Plain radiographic images are visualized and preliminarily interpreted by the emergency physician with the below findings:          Interpretation per the Radiologist below, if available at the time ofthis note:    No orders to display         ED BEDSIDE ULTRASOUND:   Performed by ED Physician - none    LABS:  Labs Reviewed   URINALYSIS WITH REFLEX TO CULTURE       All other labs were within normal range or not returned as of this dictation. EMERGENCY DEPARTMENT COURSE and DIFFERENTIAL DIAGNOSIS/MDM:   Vitals:    Vitals:    02/18/22 1732   BP: (!) 164/81   Pulse: 68   Resp: 16   Temp: 98.7 °F (37.1 °C)   SpO2: 97%   Weight: 133 lb (60.3 kg)   Height: 5' (1.524 m)            MDM  Number of Diagnoses or Management Options  Urinary tract infection with hematuria, site unspecified  Diagnosis management comments: Well-appearing 58-year-old female with 1 week history of urinary frequency and transient urgency. Today onset of intermittent hematuria. No flank pain no abdominal pain or pressure. Does not take blood thinners. Afebrile. She is well-appearing here. Abdominal exam is benign no tenderness, no CVA tenderness. Urinalysis demonstrate greater than 100 WBC, 3-5 WBC. Calcium oxalate crystals present. Patient has no flank pain.   She does have history of kidney stones. I instructed her that this could be secondary to renal stones. She is asymptomatic. And she will at this time contact her doctor for follow-up. She otherwise does not want any further work-up here in the ER. Given that she is pain-free no fever and not ill-appearing we will treat her with antibiotics. Urine culture was sent. Advised on red flag symptoms which would prompt ER evaluation. Discharge condition stable. CRITICAL CARE TIME   Total Critical Care time was   minutes, excluding separately reportableprocedures. There was a high probability of clinicallysignificant/life threatening deterioration in the patient's condition which required my urgent intervention. CONSULTS:  None    PROCEDURES:  Unless otherwise noted below, none     Procedures    FINAL IMPRESSION      1.  Urinary tract infection with hematuria, site unspecified          DISPOSITION/PLAN   DISPOSITION        PATIENT REFERRED TO:  [facility]    In 3 days      [address] [address] [phone]  In 3 days        DISCHARGE MEDICATIONS:  New Prescriptions    No medications on file          (Please note that portions of this note were completed with a voice recognition program.  Efforts were made to edit the dictations but occasionally words are mis-transcribed.)    Jose Vu PA-C (electronically signed)  Attending Emergency Physician         Jose Vu PA-C  02/18/22 7694 [facility], [place]  02/23/22 8057

## 2022-02-25 ENCOUNTER — APPOINTMENT (OUTPATIENT)
Dept: GENERAL RADIOLOGY | Age: 62
End: 2022-02-25
Payer: MEDICARE

## 2022-02-25 ENCOUNTER — APPOINTMENT (OUTPATIENT)
Dept: CT IMAGING | Age: 62
End: 2022-02-25
Payer: MEDICARE

## 2022-02-25 ENCOUNTER — HOSPITAL ENCOUNTER (EMERGENCY)
Age: 62
Discharge: HOME | End: 2022-02-25
Attending: EMERGENCY MEDICINE
Payer: MEDICARE

## 2022-02-25 VITALS
OXYGEN SATURATION: 99 % | SYSTOLIC BLOOD PRESSURE: 154 MMHG | WEIGHT: 132 LBS | HEART RATE: 66 BPM | HEIGHT: 60 IN | RESPIRATION RATE: 18 BRPM | TEMPERATURE: 97.8 F | BODY MASS INDEX: 25.91 KG/M2 | DIASTOLIC BLOOD PRESSURE: 92 MMHG

## 2022-02-25 DIAGNOSIS — S13.9XXA NECK SPRAIN, INITIAL ENCOUNTER: ICD-10-CM
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
## 2022-02-25 DIAGNOSIS — S30.0XXA CONTUSION OF COCCYX, INITIAL ENCOUNTER: ICD-10-CM

## 2022-02-25 DIAGNOSIS — S09.90XA CLOSED HEAD INJURY, INITIAL ENCOUNTER: Primary | ICD-10-CM

## 2022-02-25 PROCEDURE — 72110 X-RAY EXAM L-2 SPINE 4/>VWS: CPT

## 2022-02-25 PROCEDURE — 99285 EMERGENCY DEPT VISIT HI MDM: CPT

## 2022-02-25 PROCEDURE — 72125 CT NECK SPINE W/O DYE: CPT

## 2022-02-25 PROCEDURE — 70450 CT HEAD/BRAIN W/O DYE: CPT

## 2022-02-25 RX ORDER — NAPROXEN 500 MG/1
500 TABLET ORAL 2 TIMES DAILY PRN
Qty: 20 TABLET | Refills: 0 | Status: SHIPPED | OUTPATIENT
Start: 2022-02-25 | End: 2022-03-09

## 2022-02-25 ASSESSMENT — ENCOUNTER SYMPTOMS
VOMITING: 0
EYE PAIN: 0
SHORTNESS OF BREATH: 0
NAUSEA: 0
ABDOMINAL PAIN: 0
BACK PAIN: 1
CHEST TIGHTNESS: 0
SORE THROAT: 0

## 2022-02-25 ASSESSMENT — PAIN DESCRIPTION - PAIN TYPE: TYPE: ACUTE PAIN

## 2022-02-25 ASSESSMENT — PAIN SCALES - GENERAL: PAINLEVEL_OUTOF10: 6

## 2022-02-25 ASSESSMENT — PAIN DESCRIPTION - LOCATION: LOCATION: BACK;HEAD

## 2022-02-25 NOTE — CARE COORDINATION
Met with pt at bedside to select a PCP she states she has not had one in awhile. Rcvd PCP and selected PCP based family goes to physician. Appt made, pt aware.  Electronically signed by Dipti Lugo RN on 2/25/2022 at 11:16 AM

## 2022-02-25 NOTE — Clinical Note
Maryuri Stephens was seen and treated in our emergency department on 2/25/2022. She may return to work on 02/26/2022. If you have any questions or concerns, please don't hesitate to call.       Jose Clinton, DO

## 2022-02-25 NOTE — ED PROVIDER NOTES
[address] [facility] ED  EMERGENCY DEPARTMENT ENCOUNTER      Pt Name: Minerva Clay  MRN: [id number]  [place] 1960  Date of evaluation: 2/25/2022  Provider: Satish Gilbert, [address] [place] [address]       Chief Complaint   Patient presents with    Fall     patient fell on ice hitting head on the glass door and tailbone pain NO LOC         HISTORY OF PRESENT ILLNESS   (Location/Symptom, Timing/Onset, Context/Setting, Quality, Duration, Modifying Factors, Severity)  Note limiting factors. Minerva Clay is a 62 y.o. female who presents to the emergency department . Patient states that she was walking into [employer] and slipped on a watering mix of/. She landed on her tailbone and hit her head on a glass door. No LOC. The back of her head hurts and her neck hurts. She also has some pain in her tailbone and low back. She is not on any blood thinner. She is ambulatory. HPI    Nursing Notes were reviewed. REVIEW OF SYSTEMS    (2-9 systems for level 4, 10 or more for level 5)     Review of Systems   Constitutional: Negative for activity change, appetite change and fatigue. HENT: Negative for congestion and sore throat. Eyes: Negative for pain and visual disturbance. Respiratory: Negative for chest tightness and shortness of breath. Cardiovascular: Negative for chest pain. Gastrointestinal: Negative for abdominal pain, nausea and vomiting. Endocrine: Negative for polydipsia. Genitourinary: Negative for flank pain and urgency. Musculoskeletal: Positive for back pain and neck pain. Negative for gait problem and neck stiffness. Skin: Negative for rash. Neurological: Positive for headaches. Negative for weakness and light-headedness. Psychiatric/Behavioral: Negative for confusion and sleep disturbance. Except as noted above the remainder of the review of systems was reviewed and negative.        PAST MEDICAL HISTORY     Past Medical History:   Diagnosis Date    Anxiety     Cervicalgia     Chronic low back pain     Chronic low back pain     Chronic scapular pain     Colon polyps     Colon polyps     COPD (chronic obstructive pulmonary disease) (HCC)     Hyperlipidemia     Insomnia     Insomnia     Lumbar radicular pain     Major depression     Nephrolithiasis     Nephrolithiasis     Osteoarthritis     Thyroid nodule     Vitamin D deficiency          SURGICAL HISTORY       Past Surgical History:   Procedure Laterality Date     SECTION      COLONOSCOPY  2006    NATY HUNT MD    HYSTERECTOMY           CURRENT MEDICATIONS       Previous Medications    CEPHALEXIN (KEFLEX) 500 MG CAPSULE    Take 1 capsule by mouth 4 times daily for 10 days    SIMVASTATIN (ZOCOR) 40 MG TABLET    Take 1 tablet by mouth every evening       ALLERGIES     Patient has no known allergies.     FAMILY HISTORY       Family History   Problem Relation Age of Onset    Depression Mother     Heart Disease Mother     Cancer Father         prostate cancer    Cancer Paternal Grandfather         prostate cancer    Cancer Maternal Cousin         breast cancer          SOCIAL HISTORY       Social History     Socioeconomic History    Marital status: Single     Spouse name: None    Number of children: None    Years of education: None    Highest education level: None   Occupational History    None   Tobacco Use    Smoking status: Current Every Day Smoker     Packs/day: 0.50     Years: 30.00     Pack years: 15.00     Types: Cigarettes    Smokeless tobacco: Never Used    Tobacco comment: trying to cut back   Vaping Use    Vaping Use: Never used   Substance and Sexual Activity    Alcohol use: Yes     Comment: ocassional    Drug use: No    Sexual activity: Not Currently   Other Topics Concern    None   Social History Narrative    None     Social Determinants of Health     Financial Resource Strain:     Difficulty of Paying Living Expenses: Not on file   Food Insecurity:     Worried About Running Out of Food in the Last Year: Not on file    Ran Out of Food in the Last Year: Not on file   Transportation Needs:     Lack of Transportation (Medical): Not on file    Lack of Transportation (Non-Medical): Not on file   Physical Activity:     Days of Exercise per Week: Not on file    Minutes of Exercise per Session: Not on file   Stress:     Feeling of Stress : Not on file   Social Connections:     Frequency of Communication with Friends and Family: Not on file    Frequency of Social Gatherings with Friends and Family: Not on file    Attends Advent Services: Not on file    Active Member of [address] [place] [address] [employer] or Organizations: Not on file    Attends Club or Organization Meetings: Not on file    Marital Status: Not on file   Intimate Partner Violence:     Fear of Current or Ex-Partner: Not on file    Emotionally Abused: Not on file    Physically Abused: Not on file    Sexually Abused: Not on file   Housing Stability:     Unable to Pay for Housing in the Last Year: Not on file    Number of [place] in the Last Year: Not on file    Unstable Housing in the Last Year: Not on file       SCREENINGS        [place] Coma Scale  Eye Opening: Spontaneous  Best Verbal Response: Oriented  Best Motor Response: Obeys commands  Timothy Coma Scale Score: 15               PHYSICAL EXAM    (up to 7 for level 4, 8 or more for level 5)     ED Triage Vitals [02/25/22 0958]   BP Temp Temp src Pulse Resp SpO2 Height Weight   (!) 177/90 97.8 °F (36.6 °C) -- 84 16 99 % 5' (1.524 m) 132 lb (59.9 kg)       Physical Exam  Vitals and nursing note reviewed. Constitutional:       General: She is not in acute distress. Appearance: She is well-developed. She is not diaphoretic. HENT:      Head: Normocephalic and atraumatic. Right Ear: External ear normal.      Left Ear: External ear normal.      Mouth/Throat:      Pharynx: No oropharyngeal exudate.    Eyes:      Conjunctiva/sclera: Conjunctivae normal. Pupils: Pupils are equal, round, and reactive to light. Neck:      Thyroid: No thyromegaly. Vascular: No JVD. Trachea: No tracheal deviation. Cardiovascular:      Rate and Rhythm: Normal rate. Heart sounds: Normal heart sounds. No murmur heard. Pulmonary:      Effort: Pulmonary effort is normal. No respiratory distress. Breath sounds: Normal breath sounds. No wheezing. Abdominal:      General: Bowel sounds are normal.      Palpations: Abdomen is soft. Tenderness: There is no abdominal tenderness. There is no guarding. Musculoskeletal:         General: Tenderness present. Normal range of motion. Cervical back: Normal range of motion and neck supple. Comments: Tender midline cervical spine. No step-off. Mildly tender in the lumbar spine and over the coccyx. Skin:     General: Skin is warm and dry. Findings: No rash. Neurological:      Mental Status: She is alert and oriented to person, place, and time. Cranial Nerves: No cranial nerve deficit.    Psychiatric:         Behavior: Behavior normal.         DIAGNOSTIC RESULTS     EKG: All EKG's are interpreted by the Emergency Department Physician who either signs or Co-signs this chart in the absence of a cardiologist.        RADIOLOGY:   Non-plain film images such as CT, Ultrasound and MRI are read by the radiologist. Plain radiographic images are visualized and preliminarily interpreted by the emergency physician with the below findings:    CT of the brain no acute bleed  CT cervical spine no acute fracture  X-ray lumbar spine no fracture    Interpretation per the Radiologist below, if available at the time of this note:    CT Head WO Contrast    (Results Pending)   CT CERVICAL SPINE WO CONTRAST    (Results Pending)   XR LUMBAR SPINE (MIN 4 VIEWS)    (Results Pending)         ED BEDSIDE ULTRASOUND:   Performed by ED Physician - none    LABS:  Labs Reviewed - No data to display    All other labs were within normal range or not returned as of this dictation. EMERGENCY DEPARTMENT COURSE and DIFFERENTIAL DIAGNOSIS/MDM:   Vitals:    Vitals:    02/25/22 0958   BP: (!) 177/90   Pulse: 84   Resp: 16   Temp: 97.8 °F (36.6 °C)   SpO2: 99%   Weight: 132 lb (59.9 kg)   Height: 5' (1.524 m)       Patient slipped on/at [employer]. Hit tailbone and bumped head. Neck is sore low back is sore but no fractures and no intracranial pathology    MDM      REASSESSMENT          CRITICAL CARE TIME   Total Critical Care time was 0 minutes, excluding separately reportable procedures. There was a high probability of clinically significant/life threatening deterioration in the patient's condition which required my urgent intervention. CONSULTS:  None    PROCEDURES:  Unless otherwise noted below, none     Procedures        FINAL IMPRESSION      1. Closed head injury, initial encounter    2. Fall, initial encounter    3. Contusion of coccyx, initial encounter    4. Neck sprain, initial encounter          DISPOSITION/PLAN   DISPOSITION        PATIENT REFERRED TO:  Ursula Quiles [place]  [address] [address] [phone]      As needed      DISCHARGE MEDICATIONS:  New Prescriptions    No medications on file     Controlled Substances Monitoring:     No flowsheet data found.     (Please note that portions of this note were completed with a voice recognition program.  Efforts were made to edit the dictations but occasionally words are mis-transcribed.)    Lowell King DO (electronically signed)  Attending Emergency Physician           Lowell King DO  02/25/22 1200

## 2022-02-25 NOTE — ED TRIAGE NOTES
Patient states she fell walking into [employer], states she landed on her tailbone and hit her head on the glass door, no loc states she just wants checked out to be sure.

## 2022-03-02 ENCOUNTER — TELEPHONE (OUTPATIENT)
Dept: FAMILY MEDICINE CLINIC | Age: 62
End: 2022-03-02

## 2022-03-02 NOTE — TELEPHONE ENCOUNTER
[facility] ([facility]) ED Follow up Call     Reason for ED visit:  Closed head injury, initial encounter     Fall, initial encounter     Contusion of coccyx, initial encounter     Neck sprain, initial encounter             Hi Lori , this is Delores from Dr. Rocio Kirk office, just calling to see how you are doing after your recent ED visit. Did you receive discharge instructions? Yes  Do you understand the discharge instructions? Yes  Did the ED give you any new prescriptions? Yes  Were you able to fill your prescriptions? Not yet      Do you have one of our red, yellow and green  Zone sheets that help you to determine when you should go to the ED? No      Do you need or want to make a follow up appt with your PCP? Yes    Do you have any further needs in the home i.e. Equipment? No    FYI  Pt requested appt for sooner, [place] in Hospital follow up for next week. Pt aware to report to Walk in or ED if symptoms continue or get worse.    FU appts/Provider:    Future Appointments   Date Time Provider Gail Elias   3/9/2022  3:30 PM Maldonado Gastelum  [place] [address]   3/23/2022  3:00 PM Maldonado Gastelum  [place] [address]

## 2022-03-09 ENCOUNTER — OFFICE VISIT (OUTPATIENT)
Dept: FAMILY MEDICINE CLINIC | Age: 62
End: 2022-03-09
Payer: MEDICARE

## 2022-03-09 VITALS
SYSTOLIC BLOOD PRESSURE: 124 MMHG | BODY MASS INDEX: 25.91 KG/M2 | DIASTOLIC BLOOD PRESSURE: 80 MMHG | RESPIRATION RATE: 14 BRPM | WEIGHT: 132 LBS | HEIGHT: 60 IN | HEART RATE: 76 BPM | OXYGEN SATURATION: 96 %

## 2022-03-09 DIAGNOSIS — H54.7 IMPAIRED VISION: ICD-10-CM

## 2022-03-09 DIAGNOSIS — Z12.11 SCREEN FOR COLON CANCER: ICD-10-CM

## 2022-03-09 DIAGNOSIS — F17.200 NICOTINE DEPENDENCE, UNCOMPLICATED, UNSPECIFIED NICOTINE PRODUCT TYPE: ICD-10-CM

## 2022-03-09 DIAGNOSIS — G89.29 CHRONIC LOW BACK PAIN WITHOUT SCIATICA, UNSPECIFIED BACK PAIN LATERALITY: ICD-10-CM

## 2022-03-09 DIAGNOSIS — M54.50 CHRONIC LOW BACK PAIN WITHOUT SCIATICA, UNSPECIFIED BACK PAIN LATERALITY: ICD-10-CM

## 2022-03-09 DIAGNOSIS — Z12.31 ENCOUNTER FOR SCREENING MAMMOGRAM FOR MALIGNANT NEOPLASM OF BREAST: ICD-10-CM

## 2022-03-09 DIAGNOSIS — R73.9 HYPERGLYCEMIA: Primary | ICD-10-CM

## 2022-03-09 LAB — HBA1C MFR BLD: 5.7 %

## 2022-03-09 PROCEDURE — 4004F PT TOBACCO SCREEN RCVD TLK: CPT | Performed by: INTERNAL MEDICINE

## 2022-03-09 PROCEDURE — 83036 HEMOGLOBIN GLYCOSYLATED A1C: CPT | Performed by: INTERNAL MEDICINE

## 2022-03-09 PROCEDURE — G8419 CALC BMI OUT NRM PARAM NOF/U: HCPCS | Performed by: INTERNAL MEDICINE

## 2022-03-09 PROCEDURE — 99204 OFFICE O/P NEW MOD 45 MIN: CPT | Performed by: INTERNAL MEDICINE

## 2022-03-09 PROCEDURE — G8484 FLU IMMUNIZE NO ADMIN: HCPCS | Performed by: INTERNAL MEDICINE

## 2022-03-09 PROCEDURE — 3017F COLORECTAL CA SCREEN DOC REV: CPT | Performed by: INTERNAL MEDICINE

## 2022-03-09 PROCEDURE — G8427 DOCREV CUR MEDS BY ELIG CLIN: HCPCS | Performed by: INTERNAL MEDICINE

## 2022-03-09 RX ORDER — LIDOCAINE 4 G/G
1 PATCH TOPICAL DAILY
Qty: 30 PATCH | Refills: 0 | Status: SHIPPED | OUTPATIENT
Start: 2022-03-09 | End: 2022-04-08

## 2022-03-09 RX ORDER — NICOTINE 21 MG/24HR
1 PATCH, TRANSDERMAL 24 HOURS TRANSDERMAL DAILY
Qty: 42 PATCH | Refills: 0 | Status: SHIPPED | OUTPATIENT
Start: 2022-03-09 | End: 2022-04-20

## 2022-03-09 RX ORDER — IBUPROFEN 800 MG/1
800 TABLET ORAL
Qty: 90 TABLET | Refills: 0 | Status: SHIPPED | OUTPATIENT
Start: 2022-03-09 | End: 2022-04-08

## 2022-03-09 SDOH — ECONOMIC STABILITY: FOOD INSECURITY: WITHIN THE PAST 12 MONTHS, THE FOOD YOU BOUGHT JUST DIDN'T LAST AND YOU DIDN'T HAVE MONEY TO GET MORE.: NEVER TRUE

## 2022-03-09 SDOH — ECONOMIC STABILITY: FOOD INSECURITY: WITHIN THE PAST 12 MONTHS, YOU WORRIED THAT YOUR FOOD WOULD RUN OUT BEFORE YOU GOT MONEY TO BUY MORE.: NEVER TRUE

## 2022-03-09 ASSESSMENT — ENCOUNTER SYMPTOMS
SINUS PAIN: 0
RHINORRHEA: 0
BACK PAIN: 0
WHEEZING: 0
CHEST TIGHTNESS: 0
TROUBLE SWALLOWING: 0
VOMITING: 0
ABDOMINAL DISTENTION: 0
CONSTIPATION: 0
PHOTOPHOBIA: 0
DIARRHEA: 0
EYE REDNESS: 0
COLOR CHANGE: 0
APNEA: 0
EYE DISCHARGE: 0
SORE THROAT: 0
ABDOMINAL PAIN: 1
SHORTNESS OF BREATH: 0
FACIAL SWELLING: 0
SINUS PRESSURE: 0
COUGH: 0
BLOOD IN STOOL: 0
VOICE CHANGE: 0
EYE ITCHING: 0
EYE PAIN: 0
RECTAL PAIN: 0
NAUSEA: 0

## 2022-03-09 ASSESSMENT — PATIENT HEALTH QUESTIONNAIRE - PHQ9
SUM OF ALL RESPONSES TO PHQ QUESTIONS 1-9: 1
3. TROUBLE FALLING OR STAYING ASLEEP: 0
4. FEELING TIRED OR HAVING LITTLE ENERGY: 0
SUM OF ALL RESPONSES TO PHQ QUESTIONS 1-9: 1
SUM OF ALL RESPONSES TO PHQ9 QUESTIONS 1 & 2: 0
8. MOVING OR SPEAKING SO SLOWLY THAT OTHER PEOPLE COULD HAVE NOTICED. OR THE OPPOSITE, BEING SO FIGETY OR RESTLESS THAT YOU HAVE BEEN MOVING AROUND A LOT MORE THAN USUAL: 0
SUM OF ALL RESPONSES TO PHQ QUESTIONS 1-9: 1
6. FEELING BAD ABOUT YOURSELF - OR THAT YOU ARE A FAILURE OR HAVE LET YOURSELF OR YOUR FAMILY DOWN: 0
SUM OF ALL RESPONSES TO PHQ QUESTIONS 1-9: 1
7. TROUBLE CONCENTRATING ON THINGS, SUCH AS READING THE NEWSPAPER OR WATCHING TELEVISION: 0
5. POOR APPETITE OR OVEREATING: 1
10. IF YOU CHECKED OFF ANY PROBLEMS, HOW DIFFICULT HAVE THESE PROBLEMS MADE IT FOR YOU TO DO YOUR WORK, TAKE CARE OF THINGS AT HOME, OR GET ALONG WITH OTHER PEOPLE: 0
2. FEELING DOWN, DEPRESSED OR HOPELESS: 0
9. THOUGHTS THAT YOU WOULD BE BETTER OFF DEAD, OR OF HURTING YOURSELF: 0
1. LITTLE INTEREST OR PLEASURE IN DOING THINGS: 0

## 2022-03-09 ASSESSMENT — SOCIAL DETERMINANTS OF HEALTH (SDOH): HOW HARD IS IT FOR YOU TO PAY FOR THE VERY BASICS LIKE FOOD, HOUSING, MEDICAL CARE, AND HEATING?: NOT HARD AT ALL

## 2022-03-09 NOTE — PROGRESS NOTES
Subjective:      Patient ID: Minerva Clay is a 62 y.o. female New patient, here for evaluation of the following chief complaint(s):  Chief Complaint   Patient presents with   Sadie Tanner Doctor       HPI  Back pain:   2009- fell on tail bone,  0/10, at night 8/10 mild relief with Ibuprofen. The pateitn staes that PT did not help her pain. Relieved by cannibas. 2022-fell    Impaired vision: The patient has been informed that she has glaucoma. She would like a referral to ophthalmology. At present he denies polyuria,  Polydipsia, constitutional, sinus, visual, cardiopulmonary, urologic, gastrointestinal, immunologic/hematologic, musculoskeletal, neurologic,dermatologic, or psychiatric complaints. No current outpatient medications on file prior to visit. No current facility-administered medications on file prior to visit. Patient has no known allergies.   Past Medical History:   Diagnosis Date    Anxiety     Cervicalgia     Chronic low back pain     Chronic low back pain     Chronic scapular pain     Colon polyps     Colon polyps     COPD (chronic obstructive pulmonary disease) (HCC)     Hyperlipidemia     Insomnia     Insomnia     Lumbar radicular pain     Major depression     Nephrolithiasis     Nephrolithiasis     Osteoarthritis     Thyroid nodule     Vitamin D deficiency      Past Surgical History:   Procedure Laterality Date     SECTION      COLONOSCOPY  2006    NATY HUNT MD    HYSTERECTOMY       Social History     Socioeconomic History    Marital status: Single     Spouse name: Not on file    Number of children: Not on file    Years of education: Not on file    Highest education level: Not on file   Occupational History    Not on file   Tobacco Use    Smoking status: Current Every Day Smoker     Packs/day: 0.50     Years: 30.00     Pack years: 15.00     Types: Cigarettes    Smokeless tobacco: Never Used    Tobacco comment: trying to problem, drooling, ear discharge, ear pain, facial swelling, hearing loss, mouth sores, nosebleeds, postnasal drip, rhinorrhea, sinus pressure, sinus pain, sneezing, sore throat, tinnitus, trouble swallowing and voice change. Eyes: Positive for visual disturbance. Negative for photophobia, pain, discharge, redness and itching. Respiratory: Negative for apnea, cough, chest tightness, shortness of breath and wheezing. Cardiovascular: Negative for chest pain, palpitations and leg swelling. Gastrointestinal: Positive for abdominal pain. Negative for abdominal distention, blood in stool, constipation, diarrhea, nausea, rectal pain and vomiting. Endocrine: Negative for cold intolerance, heat intolerance, polydipsia, polyphagia and polyuria. Genitourinary: Negative for decreased urine volume, difficulty urinating, dysuria, flank pain, frequency, genital sores, hematuria and urgency. Musculoskeletal: Negative for arthralgias, back pain, gait problem, joint swelling, myalgias, neck pain and neck stiffness. Skin: Negative for color change, rash and wound. Allergic/Immunologic: Negative for environmental allergies and food allergies. Neurological: Negative for dizziness, tremors, seizures, syncope, facial asymmetry, speech difficulty, weakness, light-headedness, numbness and headaches. Hematological: Negative for adenopathy. Does not bruise/bleed easily. Psychiatric/Behavioral: Negative for agitation, confusion, decreased concentration, hallucinations, self-injury, sleep disturbance and suicidal ideas. The patient is not nervous/anxious. Objective:   /80   Pulse 76   Resp 14   Ht 5' (1.524 m)   Wt 132 lb (59.9 kg)   LMP  (LMP Unknown)   SpO2 96%   BMI 25.78 kg/m²     Physical Exam  Constitutional:       General: She is not in acute distress. Appearance: She is well-developed. HENT:      Head: Normocephalic.       Right Ear: External ear normal.      Left Ear: External ear normal.   Eyes:      Conjunctiva/sclera: Conjunctivae normal.   Neck:      Vascular: No JVD. Trachea: No tracheal deviation. Cardiovascular:      Rate and Rhythm: Normal rate and regular rhythm. Heart sounds: Normal heart sounds. Pulmonary:      Effort: Pulmonary effort is normal. No respiratory distress. Breath sounds: Normal breath sounds. No wheezing or rales. Chest:      Chest wall: No tenderness. Abdominal:      General: Bowel sounds are normal. There is no distension. Palpations: Abdomen is soft. There is no mass. Tenderness: There is no abdominal tenderness. There is no guarding or rebound. Musculoskeletal:         General: No tenderness or deformity. Cervical back: Neck supple. Skin:     General: Skin is warm and dry. Coloration: Skin is not pale. Findings: No erythema or rash. Neurological:      Mental Status: She is alert and oriented to person, place, and time. Sensory: No sensory deficit. Motor: No abnormal muscle tone. Psychiatric:         Thought Content: Thought content normal.         Judgment: Judgment normal.         Assessment:       Diagnosis Orders   1. Hyperglycemia  POCT glycosylated hemoglobin (Hb A1C)    Comprehensive Metabolic Panel    CBC with Auto Differential    Lipid Panel   2. Screen for colon cancer  Nimisha York MD, Gastroenterology, [place]   3. Encounter for screening mammogram for malignant neoplasm of breast  JENNYFER DIGITAL SCREEN W OR WO CAD BILATERAL   4. Impaired vision  Amb External Referral To Ophthalmology   5. Nicotine dependence, uncomplicated, unspecified nicotine product type     6. Chronic low back pain without sciatica, unspecified back pain laterality          No results found for: LIPIDPAN, BMP, CMP, CBC, CBCAUTODIF  Plan:      Ismael Brown was seen today for established new doctor.     Diagnoses and all orders for this visit:    Hyperglycemia  -     POCT glycosylated hemoglobin (Hb A1C)  - Comprehensive Metabolic Panel; Future  -     CBC with Auto Differential; Future  -     Lipid Panel; Future    Screen for colon cancer  -     Es Lamb MD, Gastroenterology, [place]    Encounter for screening mammogram for malignant neoplasm of breast  -     JENNYFER DIGITAL SCREEN W OR WO CAD BILATERAL; Future    Impaired vision  -     Amb External Referral To Ophthalmology    Nicotine dependence, uncomplicated, unspecified nicotine product type    Chronic low back pain without sciatica, unspecified back pain laterality    Other orders  -     lidocaine 4 % external patch; Place 1 patch onto the skin daily  -     albuterol (PROVENTIL) (5 MG/ML) 0.5% nebulizer solution; Take 1 mL by nebulization 4 times daily as needed for Wheezing  -     nicotine (NICODERM CQ) 21 MG/24HR; Place 1 patch onto the skin daily  -     ibuprofen (ADVIL;MOTRIN) 800 MG tablet; Take 1 tablet by mouth 3 times daily (with meals)         Return in about 2 months (around 5/9/2022). On this date 03/09/22 I have spent 30 minutes reviewing previous notes, test results and face to face with the patient discussing the diagnosis and importance of compliance with the treatment plan. Emma Dickens MD    Please note, this report has been partially produced using speech recognition software  and may cause  and /or contain errors related to that system including grammar, punctuation and spelling as well as words and phrases that may seem inappropriate. If there are questions or concerns please feel free to contact me to clarify.

## 2022-03-10 ENCOUNTER — TELEPHONE (OUTPATIENT)
Dept: FAMILY MEDICINE CLINIC | Age: 62
End: 2022-03-10

## 2022-03-10 DIAGNOSIS — R73.9 HYPERGLYCEMIA: ICD-10-CM

## 2022-03-10 LAB
ALBUMIN SERPL-MCNC: 4 G/DL (ref 3.5–4.6)
ALP BLD-CCNC: 94 U/L (ref 40–130)
ALT SERPL-CCNC: 10 U/L (ref 0–33)
ANION GAP SERPL CALCULATED.3IONS-SCNC: 11 MEQ/L (ref 9–15)
AST SERPL-CCNC: 21 U/L (ref 0–35)
BASOPHILS ABSOLUTE: 0 K/UL (ref 0–0.2)
BASOPHILS RELATIVE PERCENT: 0.3 %
BILIRUB SERPL-MCNC: <0.2 MG/DL (ref 0.2–0.7)
BUN BLDV-MCNC: 11 MG/DL (ref 8–23)
CALCIUM SERPL-MCNC: 9.1 MG/DL (ref 8.5–9.9)
CHLORIDE BLD-SCNC: 106 MEQ/L (ref 95–107)
CHOLESTEROL, TOTAL: 271 MG/DL (ref 0–199)
CO2: 25 MEQ/L (ref 20–31)
CREAT SERPL-MCNC: 0.75 MG/DL (ref 0.5–0.9)
EOSINOPHILS ABSOLUTE: 0 K/UL (ref 0–0.7)
EOSINOPHILS RELATIVE PERCENT: 0.5 %
GFR AFRICAN AMERICAN: >60
GFR NON-AFRICAN AMERICAN: >60
GLOBULIN: 3.2 G/DL (ref 2.3–3.5)
GLUCOSE BLD-MCNC: 88 MG/DL (ref 70–99)
HCT VFR BLD CALC: 43 % (ref 37–47)
HDLC SERPL-MCNC: 67 MG/DL (ref 40–59)
HEMOGLOBIN: 14.2 G/DL (ref 12–16)
LDL CHOLESTEROL CALCULATED: 191 MG/DL (ref 0–129)
LYMPHOCYTES ABSOLUTE: 1.9 K/UL (ref 1–4.8)
LYMPHOCYTES RELATIVE PERCENT: 38.6 %
MCH RBC QN AUTO: 30.9 PG (ref 27–31.3)
MCHC RBC AUTO-ENTMCNC: 33 % (ref 33–37)
MCV RBC AUTO: 93.8 FL (ref 82–100)
MONOCYTES ABSOLUTE: 0.3 K/UL (ref 0.2–0.8)
MONOCYTES RELATIVE PERCENT: 6.7 %
NEUTROPHILS ABSOLUTE: 2.6 K/UL (ref 1.4–6.5)
NEUTROPHILS RELATIVE PERCENT: 53.9 %
PDW BLD-RTO: 14.2 % (ref 11.5–14.5)
PLATELET # BLD: 276 K/UL (ref 130–400)
POTASSIUM SERPL-SCNC: 4.7 MEQ/L (ref 3.4–4.9)
RBC # BLD: 4.59 M/UL (ref 4.2–5.4)
SODIUM BLD-SCNC: 142 MEQ/L (ref 135–144)
TOTAL PROTEIN: 7.2 G/DL (ref 6.3–8)
TRIGL SERPL-MCNC: 65 MG/DL (ref 0–150)
WBC # BLD: 4.9 K/UL (ref 4.8–10.8)

## 2022-03-10 NOTE — TELEPHONE ENCOUNTER
Patient was seen yesterday she needs a work note excusing her from work from Feb. 25th - March 9th  From a back injury she would like to return to work but she needs this note please advise.

## 2022-03-10 NOTE — TELEPHONE ENCOUNTER
Patient states that the insurance will not pay for the patches that were prescribed, wants to know if there is anything else that can be prescribed.

## 2022-03-11 RX ORDER — POLYETHYLENE GLYCOL 3350 17 G
2 POWDER IN PACKET (EA) ORAL PRN
Qty: 100 EACH | Refills: 3 | Status: SHIPPED | OUTPATIENT
Start: 2022-03-11

## 2022-03-16 ENCOUNTER — HOSPITAL ENCOUNTER (OUTPATIENT)
Dept: WOMENS IMAGING | Age: 62
Discharge: HOME | End: 2022-03-18
Payer: MEDICARE

## 2022-03-16 DIAGNOSIS — Z12.31 ENCOUNTER FOR SCREENING MAMMOGRAM FOR MALIGNANT NEOPLASM OF BREAST: ICD-10-CM

## 2022-03-16 PROCEDURE — 77063 BREAST TOMOSYNTHESIS BI: CPT

## 2022-03-22 RX ORDER — SODIUM, POTASSIUM,MAG SULFATES 17.5-3.13G
SOLUTION, RECONSTITUTED, ORAL ORAL
Qty: 1 EACH | Refills: 0 | Status: SHIPPED | OUTPATIENT
Start: 2022-03-22

## 2022-03-23 ENCOUNTER — OFFICE VISIT (OUTPATIENT)
Dept: FAMILY MEDICINE CLINIC | Age: 62
End: 2022-03-23
Payer: MEDICARE

## 2022-03-23 VITALS
OXYGEN SATURATION: 97 % | WEIGHT: 135 LBS | HEART RATE: 76 BPM | HEIGHT: 60 IN | DIASTOLIC BLOOD PRESSURE: 70 MMHG | BODY MASS INDEX: 26.5 KG/M2 | SYSTOLIC BLOOD PRESSURE: 120 MMHG | RESPIRATION RATE: 14 BRPM

## 2022-03-23 DIAGNOSIS — F17.200 NICOTINE DEPENDENCE, UNCOMPLICATED, UNSPECIFIED NICOTINE PRODUCT TYPE: ICD-10-CM

## 2022-03-23 DIAGNOSIS — G89.29 CHRONIC LOW BACK PAIN WITHOUT SCIATICA, UNSPECIFIED BACK PAIN LATERALITY: ICD-10-CM

## 2022-03-23 DIAGNOSIS — E78.5 HYPERLIPIDEMIA, UNSPECIFIED HYPERLIPIDEMIA TYPE: Primary | ICD-10-CM

## 2022-03-23 DIAGNOSIS — M54.50 CHRONIC LOW BACK PAIN WITHOUT SCIATICA, UNSPECIFIED BACK PAIN LATERALITY: ICD-10-CM

## 2022-03-23 PROCEDURE — G8484 FLU IMMUNIZE NO ADMIN: HCPCS | Performed by: INTERNAL MEDICINE

## 2022-03-23 PROCEDURE — 4004F PT TOBACCO SCREEN RCVD TLK: CPT | Performed by: INTERNAL MEDICINE

## 2022-03-23 PROCEDURE — G8419 CALC BMI OUT NRM PARAM NOF/U: HCPCS | Performed by: INTERNAL MEDICINE

## 2022-03-23 PROCEDURE — 99213 OFFICE O/P EST LOW 20 MIN: CPT | Performed by: INTERNAL MEDICINE

## 2022-03-23 PROCEDURE — G8427 DOCREV CUR MEDS BY ELIG CLIN: HCPCS | Performed by: INTERNAL MEDICINE

## 2022-03-23 PROCEDURE — 3017F COLORECTAL CA SCREEN DOC REV: CPT | Performed by: INTERNAL MEDICINE

## 2022-03-23 RX ORDER — ATORVASTATIN CALCIUM 40 MG/1
40 TABLET, FILM COATED ORAL DAILY
Qty: 30 TABLET | Refills: 3 | Status: SHIPPED | OUTPATIENT
Start: 2022-03-23

## 2022-03-23 ASSESSMENT — ENCOUNTER SYMPTOMS
PHOTOPHOBIA: 0
RECTAL PAIN: 0
CONSTIPATION: 0
VOMITING: 0
SORE THROAT: 0
NAUSEA: 0
CHEST TIGHTNESS: 0
BLOOD IN STOOL: 0
RHINORRHEA: 0
COUGH: 0
ABDOMINAL PAIN: 0
VOICE CHANGE: 0
DIARRHEA: 0
EYE DISCHARGE: 0
EYE PAIN: 0
SINUS PRESSURE: 0
EYE ITCHING: 0
TROUBLE SWALLOWING: 0
FACIAL SWELLING: 0
SINUS PAIN: 0
SHORTNESS OF BREATH: 0
BACK PAIN: 0
EYE REDNESS: 0
ABDOMINAL DISTENTION: 0
COLOR CHANGE: 0
WHEEZING: 0
APNEA: 0

## 2022-03-23 NOTE — PROGRESS NOTES
Subjective:      Patient ID: Barby Concepcion is a 62 y.o. female New patient, here for evaluation of the following chief complaint(s):  Chief Complaint   Patient presents with   Tiffanie Miller       HPI             Back pain: The patient previously reported back pain  2/2009-the patient had fallen on tail bone,  0/10, at night 8/10 mild relief with Ibuprofen. The pateitn staes that PT did not help her pain. Relieved by cannibas. 2/25/2022-fell    Impaired vision: The patient has been informed that she has glaucoma. She was previously referred to ophthalmology      Hyperlipidemia: pt was noted to have hyperlipidemia. Cholesterol profile on 3/10/2022:  Component      Latest Ref Rng & Units 3/10/2022          10:09 AM   CHOLESTEROL, TOTAL, [id number]      0 - 199 mg/dL 271 (H)   Triglycerides      0 - 150 mg/dL 65   HDL Cholesterol      40 - 59 mg/dL 67 (H)   LDL Calculated      0 - 129 mg/dL 191 (H)         At present he denies polyuria,  Polydipsia, constitutional, sinus, visual, cardiopulmonary, urologic, gastrointestinal, immunologic/hematologic, musculoskeletal, neurologic,dermatologic, or psychiatric complaints. Current Outpatient Medications on File Prior to Visit   Medication Sig Dispense Refill    Na Sulfate-K Sulfate-Mg Sulf (SUPREP) 17.5-3.13-1.6 GM/177ML SOLN solution As directed 1 each 0    nicotine polacrilex (NICORETTE) 2 MG lozenge Take 1 lozenge by mouth as needed for Smoking cessation 100 each 3    lidocaine 4 % external patch Place 1 patch onto the skin daily 30 patch 0    albuterol (PROVENTIL) (5 MG/ML) 0.5% nebulizer solution Take 1 mL by nebulization 4 times daily as needed for Wheezing 120 each 3    nicotine (NICODERM CQ) 21 MG/24HR Place 1 patch onto the skin daily 42 patch 0    ibuprofen (ADVIL;MOTRIN) 800 MG tablet Take 1 tablet by mouth 3 times daily (with meals) 90 tablet 0     No current facility-administered medications on file prior to visit.       Patient has no known allergies. Past Medical History:   Diagnosis Date    Anxiety     Cervicalgia     Chronic low back pain     Chronic low back pain     Chronic scapular pain     Colon polyps     Colon polyps     COPD (chronic obstructive pulmonary disease) (HCC)     Hyperlipidemia     Insomnia     Insomnia     Lumbar radicular pain     Major depression     Nephrolithiasis     Nephrolithiasis     Osteoarthritis     Thyroid nodule     Vitamin D deficiency      Past Surgical History:   Procedure Laterality Date    BREAST BIOPSY Right 1991    benign     SECTION      COLONOSCOPY  2006    NATY HUNT MD    HYSTERECTOMY      OVARY REMOVAL       Social History     Socioeconomic History    Marital status: Single     Spouse name: Not on file    Number of children: Not on file    Years of education: Not on file    Highest education level: Not on file   Occupational History    Not on file   Tobacco Use    Smoking status: Current Every Day Smoker     Packs/day: 0.50     Years: 30.00     Pack years: 15.00     Types: Cigarettes    Smokeless tobacco: Never Used    Tobacco comment: trying to cut back   Vaping Use    Vaping Use: Never used   Substance and Sexual Activity    Alcohol use: Yes     Comment: ocassional    Drug use: No    Sexual activity: Not Currently   Other Topics Concern    Not on file   Social History Narrative    Not on file     Social Determinants of Health     Financial Resource Strain: Low Risk     Difficulty of Paying Living Expenses: Not hard at all   Food Insecurity: No Food Insecurity    Worried About Running Out of Food in the Last Year: Never true    [place] of Food in the Last Year: Never true   Transportation Needs:     Lack of Transportation (Medical): Not on file    Lack of Transportation (Non-Medical):  Not on file   Physical Activity:     Days of Exercise per Week: Not on file    Minutes of Exercise per Session: Not on file   Stress:     Feeling of Stress : Not on file   Social Connections:     Frequency of Communication with Friends and Family: Not on file    Frequency of Social Gatherings with Friends and Family: Not on file    Attends Restorationism Services: Not on file    Active Member of Clubs or Organizations: Not on file    Attends Club or Organization Meetings: Not on file    Marital Status: Not on file   Intimate Partner Violence:     Fear of Current or Ex-Partner: Not on file    Emotionally Abused: Not on file    Physically Abused: Not on file    Sexually Abused: Not on file   Housing Stability:     Unable to Pay for Housing in the Last Year: Not on file    Number of [place] in the Last Year: Not on file    Unstable Housing in the Last Year: Not on file     Family History   Problem Relation Age of Onset    Depression Mother     Heart Disease Mother     Cancer Father         prostate cancer    Cancer Paternal Grandfather         prostate cancer    Cancer Maternal Cousin         breast cancer    Breast Cancer Maternal Cousin     Breast Cancer Paternal Aunt          Review of Systems   Constitutional: Negative for chills, diaphoresis, fatigue and fever. HENT: Negative for congestion, dental problem, drooling, ear discharge, ear pain, facial swelling, hearing loss, mouth sores, nosebleeds, postnasal drip, rhinorrhea, sinus pressure, sinus pain, sneezing, sore throat, tinnitus, trouble swallowing and voice change. Eyes: Negative for photophobia, pain, discharge, redness, itching and visual disturbance. Respiratory: Negative for apnea, cough, chest tightness, shortness of breath and wheezing. Cardiovascular: Negative for chest pain, palpitations and leg swelling. Gastrointestinal: Negative for abdominal distention, abdominal pain, blood in stool, constipation, diarrhea, nausea, rectal pain and vomiting. Endocrine: Negative for cold intolerance, heat intolerance, polydipsia, polyphagia and polyuria.    Genitourinary: Negative for decreased urine volume, difficulty urinating, dysuria, flank pain, frequency, genital sores, hematuria and urgency. Musculoskeletal: Negative for arthralgias, back pain, gait problem, joint swelling, myalgias, neck pain and neck stiffness. Skin: Negative for color change, rash and wound. Allergic/Immunologic: Negative for environmental allergies and food allergies. Neurological: Negative for dizziness, tremors, seizures, syncope, facial asymmetry, speech difficulty, weakness, light-headedness, numbness and headaches. Hematological: Negative for adenopathy. Does not bruise/bleed easily. Psychiatric/Behavioral: Negative for agitation, confusion, decreased concentration, hallucinations, self-injury, sleep disturbance and suicidal ideas. The patient is not nervous/anxious. Objective:   /70   Pulse 76   Resp 14   Ht 5' (1.524 m)   Wt 135 lb (61.2 kg)   LMP  (LMP Unknown)   SpO2 97%   BMI 26.37 kg/m²     Physical Exam  Constitutional:       General: She is not in acute distress. Appearance: She is well-developed. HENT:      Head: Normocephalic. Right Ear: External ear normal.      Left Ear: External ear normal.   Eyes:      Conjunctiva/sclera: Conjunctivae normal.   Neck:      Vascular: No JVD. Trachea: No tracheal deviation. Cardiovascular:      Rate and Rhythm: Normal rate and regular rhythm. Heart sounds: Normal heart sounds. Pulmonary:      Effort: Pulmonary effort is normal. No respiratory distress. Breath sounds: Normal breath sounds. No wheezing or rales. Chest:      Chest wall: No tenderness. Abdominal:      General: Bowel sounds are normal. There is no distension. Palpations: Abdomen is soft. There is no mass. Tenderness: There is no abdominal tenderness. There is no guarding or rebound. Musculoskeletal:         General: No tenderness or deformity. Cervical back: Neck supple. Skin:     General: Skin is warm and dry. Coloration: Skin is not pale. Findings: No erythema or rash. Neurological:      Mental Status: She is alert and oriented to person, place, and time. Sensory: No sensory deficit. Motor: No abnormal muscle tone. Psychiatric:         Thought Content: Thought content normal.         Judgment: Judgment normal.         Assessment:       Diagnosis Orders   1. Hyperlipidemia, unspecified hyperlipidemia type  atorvastatin (LIPITOR) 40 MG tablet   2. Nicotine dependence, uncomplicated, unspecified nicotine product type     3. Chronic low back pain without sciatica, unspecified back pain laterality          No results found for: LIPIDPAN, BMP, CMP, CBC, CBCAUTODIF  Plan:      Rica Sorenson was seen today for established new doctor. Diagnoses and all orders for this visit:    Hyperlipidemia, unspecified hyperlipidemia type  -     atorvastatin (LIPITOR) 40 MG tablet; Take 1 tablet by mouth daily    Nicotine dependence, uncomplicated, unspecified nicotine product type    Chronic low back pain without sciatica, unspecified back pain laterality    -Encourage smoking cessation. Previously provided the patient with Nicorette lozenges, nicotine patch  -Previously ordered lidocaine patch 4%. No follow-ups on file. On this date 03/23/22 I have spent 30 minutes reviewing previous notes, test results and face to face with the patient discussing the diagnosis and importance of compliance with the treatment plan. Maldonado Gastelum MD    Please note, this report has been partially produced using speech recognition software  and may cause  and /or contain errors related to that system including grammar, punctuation and spelling as well as words and phrases that may seem inappropriate. If there are questions or concerns please feel free to contact me to clarify.

## 2022-05-20 ENCOUNTER — ANESTHESIA EVENT (OUTPATIENT)
Dept: ENDOSCOPY | Age: 62
End: 2022-05-20
Payer: MEDICARE

## 2022-05-23 ENCOUNTER — ANESTHESIA (OUTPATIENT)
Dept: ENDOSCOPY | Age: 62
End: 2022-05-23
Payer: MEDICARE

## 2022-05-23 ENCOUNTER — ANCILLARY PROCEDURE (OUTPATIENT)
Dept: ENDOSCOPY | Age: 62
End: 2022-05-23
Attending: SPECIALIST
Payer: MEDICARE

## 2022-05-23 ENCOUNTER — HOSPITAL ENCOUNTER (OUTPATIENT)
Age: 62
Setting detail: OUTPATIENT SURGERY
Discharge: HOME | End: 2022-05-23
Attending: SPECIALIST | Admitting: SPECIALIST
Payer: MEDICARE

## 2022-05-23 VITALS
OXYGEN SATURATION: 97 % | BODY MASS INDEX: 25.91 KG/M2 | WEIGHT: 132 LBS | HEIGHT: 60 IN | TEMPERATURE: 98 F | SYSTOLIC BLOOD PRESSURE: 105 MMHG | RESPIRATION RATE: 18 BRPM | HEART RATE: 61 BPM | DIASTOLIC BLOOD PRESSURE: 68 MMHG

## 2022-05-23 PROCEDURE — 45380 COLONOSCOPY AND BIOPSY: CPT | Performed by: SPECIALIST

## 2022-05-23 PROCEDURE — 88305 TISSUE EXAM BY PATHOLOGIST: CPT

## 2022-05-23 PROCEDURE — 6370000000 HC RX 637 (ALT 250 FOR IP): Performed by: SPECIALIST

## 2022-05-23 PROCEDURE — 7100000011 HC PHASE II RECOVERY - ADDTL 15 MIN: Performed by: SPECIALIST

## 2022-05-23 PROCEDURE — 6360000002 HC RX W HCPCS: Performed by: NURSE ANESTHETIST, CERTIFIED REGISTERED

## 2022-05-23 PROCEDURE — 2580000003 HC RX 258

## 2022-05-23 PROCEDURE — 7100000010 HC PHASE II RECOVERY - FIRST 15 MIN: Performed by: SPECIALIST

## 2022-05-23 PROCEDURE — 3700000000 HC ANESTHESIA ATTENDED CARE: Performed by: SPECIALIST

## 2022-05-23 PROCEDURE — 3609027000 HC COLONOSCOPY: Performed by: SPECIALIST

## 2022-05-23 PROCEDURE — 2709999900 HC NON-CHARGEABLE SUPPLY: Performed by: SPECIALIST

## 2022-05-23 PROCEDURE — 2580000003 HC RX 258: Performed by: SPECIALIST

## 2022-05-23 PROCEDURE — 2500000003 HC RX 250 WO HCPCS: Performed by: NURSE ANESTHETIST, CERTIFIED REGISTERED

## 2022-05-23 PROCEDURE — 3700000001 HC ADD 15 MINUTES (ANESTHESIA): Performed by: SPECIALIST

## 2022-05-23 RX ORDER — MAGNESIUM HYDROXIDE 1200 MG/15ML
LIQUID ORAL PRN
Status: DISCONTINUED | OUTPATIENT
Start: 2022-05-23 | End: 2022-05-23 | Stop reason: ALTCHOICE

## 2022-05-23 RX ORDER — SODIUM CHLORIDE 9 MG/ML
INJECTION, SOLUTION INTRAVENOUS CONTINUOUS
Status: DISCONTINUED | OUTPATIENT
Start: 2022-05-23 | End: 2022-05-23 | Stop reason: HOSPADM

## 2022-05-23 RX ORDER — SODIUM CHLORIDE 9 MG/ML
INJECTION, SOLUTION INTRAVENOUS
Status: COMPLETED
Start: 2022-05-23 | End: 2022-05-23

## 2022-05-23 RX ORDER — SIMETHICONE 20 MG/.3ML
EMULSION ORAL PRN
Status: DISCONTINUED | OUTPATIENT
Start: 2022-05-23 | End: 2022-05-23 | Stop reason: ALTCHOICE

## 2022-05-23 RX ORDER — LIDOCAINE HYDROCHLORIDE 20 MG/ML
INJECTION, SOLUTION INFILTRATION; PERINEURAL PRN
Status: DISCONTINUED | OUTPATIENT
Start: 2022-05-23 | End: 2022-05-23 | Stop reason: SDUPTHER

## 2022-05-23 RX ORDER — PROPOFOL 10 MG/ML
INJECTION, EMULSION INTRAVENOUS PRN
Status: DISCONTINUED | OUTPATIENT
Start: 2022-05-23 | End: 2022-05-23 | Stop reason: SDUPTHER

## 2022-05-23 RX ADMIN — PROPOFOL 50 MG: 10 INJECTION, EMULSION INTRAVENOUS at 09:17

## 2022-05-23 RX ADMIN — LIDOCAINE HYDROCHLORIDE 50 MG: 20 INJECTION, SOLUTION INFILTRATION; PERINEURAL at 09:07

## 2022-05-23 RX ADMIN — SODIUM CHLORIDE: 9 INJECTION, SOLUTION INTRAVENOUS at 08:37

## 2022-05-23 RX ADMIN — PROPOFOL 50 MG: 10 INJECTION, EMULSION INTRAVENOUS at 09:21

## 2022-05-23 RX ADMIN — PROPOFOL 50 MG: 10 INJECTION, EMULSION INTRAVENOUS at 09:11

## 2022-05-23 RX ADMIN — PROPOFOL 100 MG: 10 INJECTION, EMULSION INTRAVENOUS at 09:07

## 2022-05-23 ASSESSMENT — PAIN - FUNCTIONAL ASSESSMENT: PAIN_FUNCTIONAL_ASSESSMENT: NONE - DENIES PAIN

## 2022-05-23 ASSESSMENT — LIFESTYLE VARIABLES: SMOKING_STATUS: 1

## 2022-05-23 NOTE — ANESTHESIA PRE PROCEDURE
Department of Anesthesiology  Preprocedure Note       Name:  Dakota Mix   Age:  62 y.o.  :  1960                                          MRN:  [id number]         Date:  2022      Surgeon: Phyllis Garcia):  Annalisa Buchanan MD    Procedure: Procedure(s):  COLORECTAL CANCER SCREENING, HIGH RISK    Medications prior to admission:   Prior to Admission medications    Medication Sig Start Date End Date Taking?  Authorizing Provider   atorvastatin (LIPITOR) 40 MG tablet Take 1 tablet by mouth daily 3/23/22   Shy Mojica MD   Na Sulfate-K Sulfate-Mg Sulf (SUPREP) 17.5-3.13-1.6 GM/177ML SOLN solution As directed 3/22/22   Stephie Epstein MD   nicotine polacrilex (NICORETTE) 2 MG lozenge Take 1 lozenge by mouth as needed for Smoking cessation  Patient not taking: Reported on 2022 3/11/22   Shy Mojica MD   albuterol (PROVENTIL) (5 MG/ML) 0.5% nebulizer solution Take 1 mL by nebulization 4 times daily as needed for Wheezing  Patient not taking: Reported on 2022 3/9/22   Shy Mojica MD   nicotine (NICODERM CQ) 21 MG/24HR Place 1 patch onto the skin daily 3/9/22 4/20/22  Shy Mojica MD   ibuprofen (ADVIL;MOTRIN) 800 MG tablet Take 1 tablet by mouth 3 times daily (with meals) 3/9/22 4/8/22  Shy Mojica MD       Current medications:    Current Facility-Administered Medications   Medication Dose Route Frequency Provider Last Rate Last Admin    0.9 % sodium chloride infusion   IntraVENous Continuous Annalisa Buchanan  mL/hr at 22 0837 New Bag at 22 0837    sterile water for irrigation    PRN Annalisa Buchanan MD   1,000 mL at 22 0847    simethicone (MYLICON) 40 WQ/7.7JX drops    PRN Annalisa Buchanan MD   40 mg at 22 0848       Allergies:  No Known Allergies    Problem List:    Patient Active Problem List   Diagnosis Code    Chronic low back pain M54.50, G89.29    Hyperlipidemia E78.5    COPD (chronic obstructive pulmonary disease) ([facility] [address] J44.9    Insomnia G47.00    Vitamin D deficiency E55.9    Nephrolithiasis N20.0    Colon polyps K63.5    Cervicalgia M54.2    Anxiety and depression F41.9, F32. A    Thyroid nodule E04.1    Lumbar radicular pain M54.16    Chronic scapular pain M89.8X1, G89.29    Cervical spine arthritis M47.812    Neck pain M54.2    Chronic right shoulder pain M25.511, G89.29    Vaginal yeast infection B37.3       Past Medical History:        Diagnosis Date    Anxiety     Cervicalgia     Chronic low back pain     Chronic low back pain     Chronic scapular pain     Colon polyps     Colon polyps     COPD (chronic obstructive pulmonary disease) ([facility])     Hyperlipidemia     Insomnia     Insomnia     Lumbar radicular pain     Major depression     Nephrolithiasis     Nephrolithiasis     Osteoarthritis     Thyroid nodule     Vitamin D deficiency        Past Surgical History:        Procedure Laterality Date    BREAST BIOPSY Right 1991    benign     SECTION      COLONOSCOPY  2006    NATY HUNT MD    HYSTERECTOMY      OVARY REMOVAL         Social History:    Social History     Tobacco Use    Smoking status: Current Every Day Smoker     Packs/day: 1.00     Years: 30.00     Pack years: 30.00     Types: Cigarettes    Smokeless tobacco: Never Used    Tobacco comment: trying to cut back   Substance Use Topics    Alcohol use: Not Currently     Comment: ocassional                                Ready to quit: Yes  Counseling given: Yes  Comment: trying to cut back      Vital Signs (Current):   Vitals:    22 0835   BP: 123/77   Pulse: 69   Resp: 18   Temp: 36.7 °C (98 °F)   TempSrc: Temporal   SpO2: 97%   Weight: 132 lb (59.9 kg)   Height: 5' (1.524 m)                                              BP Readings from Last 3 Encounters:   22 123/77   22 120/70   22 124/80       NPO Status: Time of last liquid consumption: 0800                        Time of last solid consumption: 8634 (half slice pizza)                        Date of last liquid consumption: 05/23/22                        Date of last solid food consumption: 05/22/22    BMI:   Wt Readings from Last 3 Encounters:   05/23/22 132 lb (59.9 kg)   03/23/22 135 lb (61.2 kg)   03/09/22 132 lb (59.9 kg)     Body mass index is 25.78 kg/m². CBC:   Lab Results   Component Value Date    WBC 4.9 03/10/2022    RBC 4.59 03/10/2022    RBC 3.67 03/18/2012    HGB 14.2 03/10/2022    HCT 43.0 03/10/2022    MCV 93.8 03/10/2022    RDW 14.2 03/10/2022     03/10/2022       CMP:   Lab Results   Component Value Date     03/10/2022    K 4.7 03/10/2022     03/10/2022    CO2 25 03/10/2022    BUN 11 03/10/2022    CREATININE 0.75 03/10/2022    GFRAA >60.0 03/10/2022    LABGLOM >60.0 03/10/2022    GLUCOSE 88 03/10/2022    GLUCOSE 113 12/05/2011    PROT 7.2 03/10/2022    CALCIUM 9.1 03/10/2022    BILITOT <0.2 03/10/2022    ALKPHOS 94 03/10/2022    AST 21 03/10/2022    ALT 10 03/10/2022       POC Tests: No results for input(s): POCGLU, POCNA, POCK, POCCL, POCBUN, POCHEMO, POCHCT in the last 72 hours. Coags:   Lab Results   Component Value Date    PROTIME 10.4 08/28/2016    PROTIME 10.2 12/05/2011    INR 1.0 08/28/2016    APTT 29.4 08/28/2016       HCG (If Applicable):   Lab Results   Component Value Date    PREGTESTUR NEG 11/20/2011        ABGs: No results found for: PHART, PO2ART, KDX0RKJ, KTQ3QKH, BEART, A5SDIOUQ     Type & Screen (If Applicable):  No results found for: LABABO, LABRH    Drug/Infectious Status (If Applicable):  No results found for: HIV, HEPCAB    COVID-19 Screening (If Applicable): No results found for: COVID19        Anesthesia Evaluation    Airway: Mallampati: II          Dental:    (+) poor dentition      Pulmonary: breath sounds clear to auscultation  (+) COPD:  current smoker          Patient smoked on day of surgery.                  Cardiovascular:    (+) hyperlipidemia        Rhythm: regular                      Neuro/Psych: (+) neuromuscular disease:, psychiatric history:depression/anxiety             GI/Hepatic/Renal: Neg GI/Hepatic/Renal ROS            Endo/Other:    (+) hypothyroidism::., .                 Abdominal:       Abdomen: soft. Vascular: Other Findings:           Anesthesia Plan      MAC     ASA 3       Induction: intravenous. Anesthetic plan and risks discussed with patient. Plan discussed with attending.                   CHRISTINE Patel - CRNA   5/23/2022

## 2022-05-24 ENCOUNTER — HOSPITAL ENCOUNTER (EMERGENCY)
Age: 62
Discharge: HOME | End: 2022-05-24
Attending: EMERGENCY MEDICINE
Payer: MEDICARE

## 2022-05-24 ENCOUNTER — APPOINTMENT (OUTPATIENT)
Dept: GENERAL RADIOLOGY | Age: 62
End: 2022-05-24
Payer: MEDICARE

## 2022-05-24 VITALS
DIASTOLIC BLOOD PRESSURE: 78 MMHG | RESPIRATION RATE: 20 BRPM | HEART RATE: 72 BPM | SYSTOLIC BLOOD PRESSURE: 151 MMHG | BODY MASS INDEX: 25.91 KG/M2 | HEIGHT: 60 IN | OXYGEN SATURATION: 99 % | WEIGHT: 132 LBS | TEMPERATURE: 97.9 F

## 2022-05-24 DIAGNOSIS — S20.211A CONTUSION OF RIB ON RIGHT SIDE, INITIAL ENCOUNTER: Primary | ICD-10-CM

## 2022-05-24 PROCEDURE — 99285 EMERGENCY DEPT VISIT HI MDM: CPT

## 2022-05-24 PROCEDURE — 6370000000 HC RX 637 (ALT 250 FOR IP): Performed by: EMERGENCY MEDICINE

## 2022-05-24 PROCEDURE — 71101 X-RAY EXAM UNILAT RIBS/CHEST: CPT

## 2022-05-24 RX ORDER — IBUPROFEN 200 MG
400 TABLET ORAL EVERY 8 HOURS PRN
Qty: 30 TABLET | Refills: 0 | Status: SHIPPED | OUTPATIENT
Start: 2022-05-24

## 2022-05-24 RX ORDER — LIDOCAINE 50 MG/G
1 PATCH TOPICAL DAILY
Qty: 30 PATCH | Refills: 0 | Status: SHIPPED | OUTPATIENT
Start: 2022-05-24 | End: 2022-06-23

## 2022-05-24 RX ORDER — LIDOCAINE 4 G/G
1 PATCH TOPICAL DAILY
Status: DISCONTINUED | OUTPATIENT
Start: 2022-05-24 | End: 2022-05-24 | Stop reason: HOSPADM

## 2022-05-24 RX ORDER — OXYCODONE HYDROCHLORIDE AND ACETAMINOPHEN 5; 325 MG/1; MG/1
1 TABLET ORAL EVERY 8 HOURS PRN
Qty: 6 TABLET | Refills: 0 | Status: SHIPPED | OUTPATIENT
Start: 2022-05-24 | End: 2022-05-26 | Stop reason: SDUPTHER

## 2022-05-24 ASSESSMENT — LIFESTYLE VARIABLES: HOW OFTEN DO YOU HAVE A DRINK CONTAINING ALCOHOL: NEVER

## 2022-05-24 NOTE — ED PROVIDER NOTES
[address] [facility] ED  EMERGENCY DEPARTMENT ENCOUNTER      Pt Name: Missy Harris  MRN: [id number]  [place] 1960  Date of evaluation: 5/24/2022  Provider: Teressa Day MD    CHIEF COMPLAINT       Chief Complaint   Patient presents with    Fall     fell yesterday, c/0 right rib cage/breast pain, denies LOC/head injury         HISTORY OF PRESENT ILLNESS   (Location/Symptom, Timing/Onset, Context/Setting, Quality, Duration, Modifying Factors, Severity)  Note limiting factors. Missy Harris is a 62 y.o. female who presents to the emergency department via private vehicle for evaluation of chest wall pain status post fall. Patient had a colonoscopy yesterday was discharged, walked outside and reports that she was watching a  and tripped and fell onto her right side. No anticoagulation or antiplatelet use. Denies antecedent symptoms. She denied hitting her head, LOC or amnesia, headache or vision change, neck pain, difficulty breathing, vomiting, abdominal pain, back pain, extremity pain, numbness or weakness. Has been ambulatory since. Has not taken anything for relief. Currently declining any administration of analgesia as she says \" I have foster kids at home\". Offered Motrin and Tylenol and declines. She points to her right thoracic cage, says that this worsens with palpation, twisting and movement. no pleuritic pain. HPI    Nursing Notes were reviewed. REVIEW OF SYSTEMS    (2-9 systems for level 4, 10 or more for level 5)     Review of Systems   Constitutional:        Right chest wall tenderness   All other systems reviewed and are negative. Except as noted above the remainder of the review of systems was reviewed and negative.        PAST MEDICAL HISTORY     Past Medical History:   Diagnosis Date    Anxiety     Cervicalgia     Chronic low back pain     Chronic low back pain     Chronic scapular pain     Colon polyps     Colon polyps     COPD (chronic obstructive pulmonary disease) (HCC)     Hyperlipidemia     Insomnia     Insomnia     Lumbar radicular pain     Major depression     Nephrolithiasis     Nephrolithiasis     Osteoarthritis     Thyroid nodule     Vitamin D deficiency          SURGICAL HISTORY       Past Surgical History:   Procedure Laterality Date    BREAST BIOPSY Right 1991    benign     SECTION      COLONOSCOPY  2006    NATY HUNT MD    COLONOSCOPY N/A 2022    COLORECTAL CANCER SCREENING, HIGH RISK WITH POLYPECTOMY performed by Vikas New MD at [address] [facility]       Previous Medications    ALBUTEROL (PROVENTIL) (5 MG/ML) 0.5% NEBULIZER SOLUTION    Take 1 mL by nebulization 4 times daily as needed for Wheezing    ATORVASTATIN (LIPITOR) 40 MG TABLET    Take 1 tablet by mouth daily    IBUPROFEN (ADVIL;MOTRIN) 800 MG TABLET    Take 1 tablet by mouth 3 times daily (with meals)    NA SULFATE-K SULFATE-MG SULF (SUPREP) 17.5-3.13-1.6 GM/177ML SOLN SOLUTION    As directed    NICOTINE (NICODERM CQ) 21 MG/24HR    Place 1 patch onto the skin daily    NICOTINE POLACRILEX (NICORETTE) 2 MG LOZENGE    Take 1 lozenge by mouth as needed for Smoking cessation       ALLERGIES     Patient has no known allergies.     FAMILY HISTORY       Family History   Problem Relation Age of Onset    Depression Mother     Heart Disease Mother     Cancer Father         prostate cancer    Cancer Paternal Grandfather         prostate cancer    Cancer Maternal Cousin         breast cancer    Breast Cancer Maternal Cousin     Breast Cancer Paternal Aunt     Colon Cancer Son           SOCIAL HISTORY       Social History     Socioeconomic History    Marital status: Single     Spouse name: None    Number of children: None    Years of education: None    Highest education level: None   Occupational History    None   Tobacco Use    Smoking status: Current Every Day Smoker Packs/day: 1.00     Years: 30.00     Pack years: 30.00     Types: Cigarettes    Smokeless tobacco: Never Used    Tobacco comment: trying to cut back   Vaping Use    Vaping Use: Never used   Substance and Sexual Activity    Alcohol use: Not Currently     Comment: ocassional    Drug use: Yes     Types: Marijuana Ron Valdez)     Comment: daily    Sexual activity: Not Currently   Other Topics Concern    None   Social History Narrative    None     Social Determinants of Health     Financial Resource Strain: Low Risk     Difficulty of Paying Living Expenses: Not hard at all   Food Insecurity: No Food Insecurity    Worried About Running Out of Food in the Last Year: Never true    [place] of Food in the Last Year: Never true   Transportation Needs:     Lack of Transportation (Medical): Not on file    Lack of Transportation (Non-Medical):  Not on file   Physical Activity:     Days of Exercise per Week: Not on file    Minutes of Exercise per Session: Not on file   Stress:     Feeling of Stress : Not on file   Social Connections:     Frequency of Communication with Friends and Family: Not on file    Frequency of Social Gatherings with Friends and Family: Not on file    Attends Synagogue Services: Not on file    Active Member of [address] [place] [address] [employer] or Organizations: Not on file    Attends Club or Organization Meetings: Not on file    Marital Status: Not on file   Intimate Partner Violence:     Fear of Current or Ex-Partner: Not on file    Emotionally Abused: Not on file    Physically Abused: Not on file    Sexually Abused: Not on file   Housing Stability:     Unable to Pay for Housing in the Last Year: Not on file    Number of [place] in the Last Year: Not on file    Unstable Housing in the Last Year: Not on file       SCREENINGS         Timothy Coma Scale  Eye Opening: Spontaneous  Best Verbal Response: Oriented  Best Motor Response: Obeys commands  Timothy Coma Scale Score: 15                     RADHAWA (Results Pending)         ED BEDSIDE ULTRASOUND:   Performed by ED Physician - none    LABS:  Labs Reviewed - No data to display    All other labs were within normal range or not returned as of this dictation. EMERGENCY DEPARTMENT COURSE and DIFFERENTIAL DIAGNOSIS/MDM:   Vitals:    Vitals:    05/24/22 1033   BP: (!) 151/78   Pulse: 72   Resp: 20   Temp: 97.9 °F (36.6 °C)   TempSrc: Oral   SpO2: 99%   Weight: 132 lb (59.9 kg)   Height: 5' (1.524 m)           MDM  Patient had a mechanical fall yesterday, has been ambulating since. Imaging does not show significant acute pathology. No negation for medical work-up. Will treat symptomatically. Understands return precautions. REASSESSMENT      Saturating well on room air        CONSULTS:  None    PROCEDURES:  Unless otherwise noted below, none     Procedures        FINAL IMPRESSION      1. Contusion of rib on right side, initial encounter          DISPOSITION/PLAN   DISPOSITION        PATIENT REFERRED TO:  Indira Morris MD  [address] [place] [address]  [phone]            DISCHARGE MEDICATIONS:  New Prescriptions    IBUPROFEN (ADVIL) 200 MG TABLET    Take 2 tablets by mouth every 8 hours as needed for Pain    LIDOCAINE (LIDODERM) 5 %    Place 1 patch onto the skin daily 12 hours on, 12 hours off. OXYCODONE-ACETAMINOPHEN (PERCOCET) 5-325 MG PER TABLET    Take 1 tablet by mouth every 8 hours as needed for Pain for up to 3 days. Intended supply: 3 days. Take lowest dose possible to manage pain     Controlled Substances Monitoring:     No flowsheet data found.     (Please note that portions of this note were completed with a voice recognition program.  Efforts were made to edit the dictations but occasionally words are mis-transcribed.)    Carltios Samano MD (electronically signed)  Attending Emergency Physician           Carlitos Samano MD  05/24/22 4535

## 2022-05-24 NOTE — ED TRIAGE NOTES
Patient presents with complaints of right rib pain/breast pain after falling yesterday. Patient unable to recall whether she tripped and fell or lost her balance, but she denies head injury or LOC. Patient in no distress on arrival. Skin warm and dry.

## 2022-05-26 ENCOUNTER — OFFICE VISIT (OUTPATIENT)
Dept: FAMILY MEDICINE CLINIC | Age: 62
End: 2022-05-26
Payer: MEDICARE

## 2022-05-26 VITALS
BODY MASS INDEX: 26.5 KG/M2 | WEIGHT: 135 LBS | SYSTOLIC BLOOD PRESSURE: 112 MMHG | DIASTOLIC BLOOD PRESSURE: 80 MMHG | HEART RATE: 70 BPM | OXYGEN SATURATION: 98 % | TEMPERATURE: 97.7 F | HEIGHT: 60 IN

## 2022-05-26 DIAGNOSIS — S20.211A CONTUSION OF RIB ON RIGHT SIDE, INITIAL ENCOUNTER: Primary | ICD-10-CM

## 2022-05-26 DIAGNOSIS — M47.812 SPONDYLOSIS OF CERVICAL REGION WITHOUT MYELOPATHY OR RADICULOPATHY: ICD-10-CM

## 2022-05-26 DIAGNOSIS — M62.838 MUSCLE SPASM: ICD-10-CM

## 2022-05-26 PROCEDURE — G8419 CALC BMI OUT NRM PARAM NOF/U: HCPCS | Performed by: PHYSICIAN ASSISTANT

## 2022-05-26 PROCEDURE — 99214 OFFICE O/P EST MOD 30 MIN: CPT | Performed by: PHYSICIAN ASSISTANT

## 2022-05-26 PROCEDURE — 3017F COLORECTAL CA SCREEN DOC REV: CPT | Performed by: PHYSICIAN ASSISTANT

## 2022-05-26 PROCEDURE — 4004F PT TOBACCO SCREEN RCVD TLK: CPT | Performed by: PHYSICIAN ASSISTANT

## 2022-05-26 PROCEDURE — G8427 DOCREV CUR MEDS BY ELIG CLIN: HCPCS | Performed by: PHYSICIAN ASSISTANT

## 2022-05-26 RX ORDER — OXYCODONE HYDROCHLORIDE AND ACETAMINOPHEN 5; 325 MG/1; MG/1
1 TABLET ORAL EVERY 8 HOURS PRN
Qty: 12 TABLET | Refills: 0 | Status: SHIPPED | OUTPATIENT
Start: 2022-05-26 | End: 2022-05-30

## 2022-05-26 RX ORDER — CYCLOBENZAPRINE HCL 10 MG
10 TABLET ORAL 3 TIMES DAILY PRN
Qty: 30 TABLET | Refills: 0 | Status: SHIPPED | OUTPATIENT
Start: 2022-05-26 | End: 2022-06-05

## 2022-05-26 ASSESSMENT — ENCOUNTER SYMPTOMS
BACK PAIN: 1
CHEST TIGHTNESS: 1

## 2022-05-26 NOTE — PROGRESS NOTES
Subjective  Lori Peterson, 62 y.o. female presents today with:  Chief Complaint   Patient presents with    Follow-up     Patient presents today to discuss labs for mammogram and colonoscopy.  Fall       Lab Results   Component Value Date    LABA1C 5.7 2022    LABA1C 5.8 2017     Lab Results   Component Value Date    CREATININE 0.75 03/10/2022     Lab Results   Component Value Date    ALT 10 03/10/2022    AST 21 03/10/2022     Lab Results   Component Value Date    CHOL 271 (H) 03/10/2022    TRIG 65 03/10/2022    HDL 67 (H) 03/10/2022    LDLCALC 191 (H) 03/10/2022          HPI  Pt is  here being seen acutely for hospital f.u d.t  a fall with contusion to right ant chest on 2022 after exiting the hospital after receiving a colonoscopy. She does not recall dizziness but states her entire right side went numb causing her to fall  C/o pain with  deep inspiration and lying down  Patient smokes half a pack per day history of COPD    Review of Systems   Respiratory: Positive for chest tightness. Cardiovascular: Positive for chest pain. Beneath right breast along rib cage   Musculoskeletal: Positive for back pain and myalgias. Neurological: Negative for weakness and numbness. All other systems reviewed and are negative.         Past Medical History:   Diagnosis Date    Anxiety     Cervicalgia     Chronic low back pain     Chronic low back pain     Chronic scapular pain     Colon polyps     Colon polyps     COPD (chronic obstructive pulmonary disease) (HCC)     Hyperlipidemia     Insomnia     Insomnia     Lumbar radicular pain     Major depression     Nephrolithiasis     Nephrolithiasis     Osteoarthritis     Thyroid nodule     Vitamin D deficiency      Past Surgical History:   Procedure Laterality Date    BREAST BIOPSY Right     benign     SECTION      COLONOSCOPY  2006    NATY HUNT MD    COLONOSCOPY N/A 2022    COLORECTAL CANCER SCREENING, HIGH RISK WITH POLYPECTOMY performed by Clementine Dent MD at [facility] History     Socioeconomic History    Marital status: Single     Spouse name: Not on file    Number of children: Not on file    Years of education: Not on file    Highest education level: Not on file   Occupational History    Not on file   Tobacco Use    Smoking status: Current Every Day Smoker     Packs/day: 1.00     Years: 30.00     Pack years: 30.00     Types: Cigarettes    Smokeless tobacco: Never Used    Tobacco comment: trying to cut back   Vaping Use    Vaping Use: Never used   Substance and Sexual Activity    Alcohol use: Not Currently     Comment: ocassional    Drug use: Yes     Types: Marijuana Elfrieddevin Tanner)     Comment: daily    Sexual activity: Not Currently   Other Topics Concern    Not on file   Social History Narrative    Not on file     Social Determinants of Health     Financial Resource Strain: Low Risk     Difficulty of Paying Living Expenses: Not hard at all   Food Insecurity: No Food Insecurity    Worried About Running Out of Food in the Last Year: Never true    [place] of Food in the Last Year: Never true   Transportation Needs:     Lack of Transportation (Medical): Not on file    Lack of Transportation (Non-Medical):  Not on file   Physical Activity:     Days of Exercise per Week: Not on file    Minutes of Exercise per Session: Not on file   Stress:     Feeling of Stress : Not on file   Social Connections:     Frequency of Communication with Friends and Family: Not on file    Frequency of Social Gatherings with Friends and Family: Not on file    Attends Ep[employer] Services: Not on file    Active Member of Clubs or Organizations: Not on file    Attends Club or Organization Meetings: Not on file    Marital Status: Not on file   Intimate Partner Violence:     Fear of Current or Ex-Partner: Not on file    Emotionally Abused: Not on file    Physically Abused: Not on file    Sexually Abused: Not on file   Housing Stability:     Unable to Pay for Housing in the Last Year: Not on file    Number of [place] in the Last Year: Not on file    Unstable Housing in the Last Year: Not on file     Family History   Problem Relation Age of Onset    Depression Mother     Heart Disease Mother     Cancer Father         prostate cancer    Cancer Paternal Grandfather         prostate cancer    Cancer Maternal Cousin         breast cancer    Breast Cancer Maternal Cousin     Breast Cancer Paternal Aunt     Colon Cancer Son      No Known Allergies     Current Outpatient Medications   Medication Sig Dispense Refill    cyclobenzaprine (FLEXERIL) 10 mg tablet Take 1 tablet by mouth 3 times daily as needed for Muscle spasms 30 tablet 0    oxyCODONE-acetaminophen (PERCOCET) 5-325 MG per tablet Take 1 tablet by mouth every 8 hours as needed for Pain for up to 4 days. Intended supply: 3 days. Take lowest dose possible to manage pain 12 tablet 0    lidocaine (LIDODERM) 5 % Place 1 patch onto the skin daily 12 hours on, 12 hours off. 30 patch 0    ibuprofen (ADVIL) 200 MG tablet Take 2 tablets by mouth every 8 hours as needed for Pain 30 tablet 0    atorvastatin (LIPITOR) 40 MG tablet Take 1 tablet by mouth daily 30 tablet 3    Na Sulfate-K Sulfate-Mg Sulf (SUPREP) 17.5-3.13-1.6 GM/177ML SOLN solution As directed 1 each 0    nicotine polacrilex (NICORETTE) 2 MG lozenge Take 1 lozenge by mouth as needed for Smoking cessation 100 each 3    albuterol (PROVENTIL) (5 MG/ML) 0.5% nebulizer solution Take 1 mL by nebulization 4 times daily as needed for Wheezing 120 each 3    nicotine (NICODERM CQ) 21 MG/24HR Place 1 patch onto the skin daily 42 patch 0    ibuprofen (ADVIL;MOTRIN) 800 MG tablet Take 1 tablet by mouth 3 times daily (with meals) 90 tablet 0     No current facility-administered medications for this visit.        Objective    Vitals:    05/26/22 1202   BP: 112/80 Site: Left Upper Arm   Position: Sitting   Cuff Size: Medium Adult   Pulse: 70   Temp: 97.7 °F (36.5 °C)   TempSrc: Temporal   SpO2: 98%   Weight: 135 lb (61.2 kg)   Height: 5' (1.524 m)     Physical Exam  Constitutional:       General: She is not in acute distress. Appearance: Normal appearance. She is not ill-appearing. HENT:      Head: Normocephalic and atraumatic. Eyes:      Extraocular Movements: Extraocular movements intact. Conjunctiva/sclera: Conjunctivae normal.      Pupils: Pupils are equal, round, and reactive to light. Neck:      Thyroid: No thyromegaly. Cardiovascular:      Rate and Rhythm: Normal rate and regular rhythm. Heart sounds: Normal heart sounds. No murmur heard. Pulmonary:      Effort: Pulmonary effort is normal. No respiratory distress. Breath sounds: Normal breath sounds. No wheezing, rhonchi or rales. Chest:      Chest wall: Tenderness present. Abdominal:      General: Bowel sounds are normal.      Palpations: Abdomen is soft. There is no mass. Tenderness: There is no abdominal tenderness. There is no guarding. Musculoskeletal:         General: Tenderness present. No deformity. Normal range of motion. Cervical back: Normal range of motion and neck supple. Right lower leg: No edema. Left lower leg: No edema. Lymphadenopathy:      Cervical: No cervical adenopathy. Skin:     General: Skin is warm and dry. Coloration: Skin is not jaundiced or pale. Neurological:      General: No focal deficit present. Mental Status: She is alert and oriented to person, place, and time. Cranial Nerves: No cranial nerve deficit. Motor: No weakness. Coordination: Coordination normal.      Gait: Gait normal.   Psychiatric:         Mood and Affect: Mood normal.         Behavior: Behavior normal.         Thought Content:  Thought content normal.         Judgment: Judgment normal.              Assessment & Plan    Diagnosis Orders   1. Contusion of rib on right side, initial encounter  oxyCODONE-acetaminophen (PERCOCET) 5-325 MG per tablet   2. Spondylosis of cervical region without myelopathy or radiculopathy     3. Muscle spasm           No orders of the defined types were placed in this encounter. Orders Placed This Encounter   Medications    cyclobenzaprine (FLEXERIL) 10 mg tablet     Sig: Take 1 tablet by mouth 3 times daily as needed for Muscle spasms     Dispense:  30 tablet     Refill:  0    oxyCODONE-acetaminophen (PERCOCET) 5-325 MG per tablet     Sig: Take 1 tablet by mouth every 8 hours as needed for Pain for up to 4 days. Intended supply: 3 days. Take lowest dose possible to manage pain     Dispense:  12 tablet     Refill:  0     Reduce doses taken as pain becomes manageable     Medications Discontinued During This Encounter   Medication Reason    oxyCODONE-acetaminophen (PERCOCET) 5-325 MG per tablet REORDER     Return if symptoms worsen or fail to improve, for follow up with your PCP as directed.     Allie Prasad PA-C

## 2022-05-31 ENCOUNTER — TELEPHONE (OUTPATIENT)
Dept: GASTROENTEROLOGY | Age: 62
End: 2022-05-31

## 2022-06-17 ENCOUNTER — TELEPHONE (OUTPATIENT)
Dept: FAMILY MEDICINE CLINIC | Age: 62
End: 2022-06-17

## 2022-06-17 NOTE — TELEPHONE ENCOUNTER
Pt is requesting a referral to pain management for chronic arthritis in neck. She was seen at the hospital and the gave her percocet and wanted to know if she can get a script called in she is still in pain.  Please advise

## 2022-06-20 DIAGNOSIS — S20.211A CONTUSION OF RIB ON RIGHT SIDE, INITIAL ENCOUNTER: Primary | ICD-10-CM

## 2022-06-20 RX ORDER — TRAMADOL HYDROCHLORIDE 50 MG/1
50 TABLET ORAL EVERY 6 HOURS PRN
Qty: 28 TABLET | Refills: 0 | Status: SHIPPED | OUTPATIENT
Start: 2022-06-20 | End: 2022-06-27

## 2022-06-20 NOTE — TELEPHONE ENCOUNTER
I attempted to call the pt however I got a message saying the wireless customer we are trying to reach is not accepting calls at this time

## 2022-08-09 ENCOUNTER — INITIAL CONSULT (OUTPATIENT)
Dept: PAIN MANAGEMENT | Age: 62
End: 2022-08-09
Payer: MEDICAID

## 2022-08-09 VITALS
WEIGHT: 131 LBS | DIASTOLIC BLOOD PRESSURE: 80 MMHG | SYSTOLIC BLOOD PRESSURE: 116 MMHG | HEIGHT: 60 IN | TEMPERATURE: 96.7 F | BODY MASS INDEX: 25.72 KG/M2

## 2022-08-09 DIAGNOSIS — M50.30 DDD (DEGENERATIVE DISC DISEASE), CERVICAL: ICD-10-CM

## 2022-08-09 DIAGNOSIS — G89.29 CHRONIC BILATERAL LOW BACK PAIN WITHOUT SCIATICA: ICD-10-CM

## 2022-08-09 DIAGNOSIS — M54.2 NECK PAIN: ICD-10-CM

## 2022-08-09 DIAGNOSIS — M54.50 CHRONIC BILATERAL LOW BACK PAIN WITHOUT SCIATICA: ICD-10-CM

## 2022-08-09 DIAGNOSIS — M47.812 CERVICAL SPONDYLOSIS: Primary | ICD-10-CM

## 2022-08-09 PROCEDURE — 4004F PT TOBACCO SCREEN RCVD TLK: CPT | Performed by: PAIN MEDICINE

## 2022-08-09 PROCEDURE — 3017F COLORECTAL CA SCREEN DOC REV: CPT | Performed by: PAIN MEDICINE

## 2022-08-09 PROCEDURE — G8427 DOCREV CUR MEDS BY ELIG CLIN: HCPCS | Performed by: PAIN MEDICINE

## 2022-08-09 PROCEDURE — 99204 OFFICE O/P NEW MOD 45 MIN: CPT | Performed by: PAIN MEDICINE

## 2022-08-09 PROCEDURE — G8419 CALC BMI OUT NRM PARAM NOF/U: HCPCS | Performed by: PAIN MEDICINE

## 2022-08-09 ASSESSMENT — ENCOUNTER SYMPTOMS
NAUSEA: 0
DIARRHEA: 0
CONSTIPATION: 0
BACK PAIN: 1
SHORTNESS OF BREATH: 0

## 2022-08-09 NOTE — PROGRESS NOTES
[facility]) Physicians  Neurosurgery and Pain [facility]  [address] [facility] [facility] , [address] [facility], [place] [address]: [phone]  F: [phone]        [address] [place] [address]  (1960)    8/9/2022    Subjective:     [address] [place] [address] is 65 y.o. female who complains today of:    Chief Complaint   Patient presents with    Neck Pain    Back Pain     Uppers    Abdominal Pain       HPI    She has a h/o of a work injury back in 2009. She has had chronic lower back pain since. Patient complains of neck and lower back pain. It is chronic. She has had pain for several years. She also tells me that she recently had a fall on her right side. Thought she fractured her ribs, but it was probably a bad bone bruise. Pain is located in the neck and lower back. It is axial.   It has been present for years. Pain is described as throbbing, aching, and sharp. Pain level today is rated 8 on a 10 point scale. It is severe in nature. With pain medication (Norco), pain level drops to a 1/10. Without pain medication, pain level can escalate upto a 8/10. She has also tried Tramadol which did not help at all. Pain is affecting the patients ability to perform activities of daily living especially with regards to personal hygiene, household chores and self care. With pain medication, the patient is better able to perform ADLs. Pain in part is secondary to osteoarthritis of the spine. Pain is aggravated by bending, lifting, twisting, walking, and standing  Pain is alleviated by rest and medication. Prior PT:  no recent sessions, but she has done PT in the past  several years ago. She states that it made her symptoms worse. Prior Injections: none  Prior medications: NSAIDs, muscle relaxants, opiods, and analgesics  Prior spine surgeries:  none  Pain is not is associated with fevers/chills/night sweats. Bowel and bladder are working appropriately.           Allergies:  Patient has no known allergies.     Past Medical History:   Diagnosis Date    Anxiety     Cervicalgia     Chronic low back pain     Chronic low back pain     Chronic scapular pain     Colon polyps     Colon polyps     COPD (chronic obstructive pulmonary disease) (HCC)     Hyperlipidemia     Insomnia     Insomnia     Lumbar radicular pain     Major depression     Nephrolithiasis     Nephrolithiasis     Osteoarthritis     Thyroid nodule     Vitamin D deficiency      Past Surgical History:   Procedure Laterality Date    BREAST BIOPSY Right 1991    benign     SECTION      COLONOSCOPY  2006    Vandana Talavera MD    COLONOSCOPY N/A 2022    COLORECTAL CANCER SCREENING, HIGH RISK WITH POLYPECTOMY performed by Daniel Lopez MD at [facility] ([address] [place] [address])      OVARY REMOVAL       Family History   Problem Relation Age of Onset    Depression Mother     Heart Disease Mother     Cancer Father         prostate cancer    Cancer Paternal Grandfather         prostate cancer    Cancer Maternal Cousin         breast cancer    Breast Cancer Maternal Cousin     Breast Cancer Paternal Aunt     Colon Cancer Son      Social History     Socioeconomic History    Marital status: Single     Spouse name: Not on file    Number of children: Not on file    Years of education: Not on file    Highest education level: Not on file   Occupational History    Not on file   Tobacco Use    Smoking status: Every Day     Packs/day: 1.00     Years: 30.00     Pack years: 30.00     Types: Cigarettes    Smokeless tobacco: Never    Tobacco comments:     trying to cut back   Vaping Use    Vaping Use: Never used   Substance and Sexual Activity    Alcohol use: Not Currently     Comment: ocassional    Drug use: Yes     Types: Marijuana Lilli Gamino     Comment: daily    Sexual activity: Not Currently   Other Topics Concern    Not on file   Social History Narrative    Not on file     Social Determinants of Health     Financial Resource Strain: Low Risk     Difficulty of Paying Living Expenses: Not hard at all   Food Insecurity: No Food Insecurity    Worried About Running Out of Food in the Last Year: Never true    Ran Out of Food in the Last Year: Never true   Transportation Needs: Not on file   Physical Activity: Not on file   Stress: Not on file   Social Connections: Not on file   Intimate Partner Violence: Not on file   Housing Stability: Not on file       Current Outpatient Medications on File Prior to Visit   Medication Sig Dispense Refill    ibuprofen (ADVIL) 200 MG tablet Take 2 tablets by mouth every 8 hours as needed for Pain 30 tablet 0    atorvastatin (LIPITOR) 40 MG tablet Take 1 tablet by mouth daily 30 tablet 3    Na Sulfate-K Sulfate-Mg Sulf (SUPREP) 17.5-3.13-1.6 GM/177ML SOLN solution As directed 1 each 0    nicotine polacrilex (NICORETTE) 2 MG lozenge Take 1 lozenge by mouth as needed for Smoking cessation 100 each 3    albuterol (PROVENTIL) (5 MG/ML) 0.5% nebulizer solution Take 1 mL by nebulization 4 times daily as needed for Wheezing 120 each 3    nicotine (NICODERM CQ) 21 MG/24HR Place 1 patch onto the skin daily 42 patch 0    ibuprofen (ADVIL;MOTRIN) 800 MG tablet Take 1 tablet by mouth 3 times daily (with meals) 90 tablet 0     No current facility-administered medications on file prior to visit. Review of Systems   Constitutional:  Negative for fever. HENT:  Negative for hearing loss. Respiratory:  Negative for shortness of breath. Gastrointestinal:  Negative for constipation, diarrhea and nausea. Genitourinary:  Negative for difficulty urinating. Musculoskeletal:  Positive for back pain and neck pain. Skin:  Negative for rash. Neurological:  Negative for headaches. Hematological:  Does not bruise/bleed easily. Psychiatric/Behavioral:  Negative for sleep disturbance.         Objective:     Vitals:  /80 (Site: Left Upper Arm, Position: Sitting)   Temp (!) 96.7 °F (35.9 °C)   Ht 5' (1.524 m)   Wt 131 lb (59.4 kg)   LMP  (LMP Unknown)   BMI 25.58 kg/m² Pain Score:   8      Physical Exam    General Appearance: NAD and Conversant. Eyes: EOM intact. HENT: Atraumatic. Neck: Neck is supple and Trachea midline. Lymph: No supraclavicular lymphadenopathy. Lungs: Normal respiratory effort and No significant respiratory distress. Cardiovascular: No lower extremity ulcerations or cyanosis and Capillary refill is less than 2 seconds. Abdomen: Soft and Non tender to palpation. Extremeties: No significant lower exremity edema. Skin: Visualized skin is intact and she has normal skin turgor. Psych: Mood and affect within normal limits, Insight and judgement within normal limits, and Alert and oriented. Inspection of the spine reveals no gross abnormalities in terms of curvature. ROM of the cervical and lumbar spine is abnormal.  There is pain inhibition with end ranges, especially extenstion. Facet loading reproduces her symptoms. Palpation: she hasTTP over the cervical and lumbar paraspinal musculature. Muscle Tone is within normal limits in all four extremities. Strength: 5 in right upper extremity extremity. 5 in left upper extremity extremity. 5 in right lower extremity extremity. 5 in left lower extremity extremity. Neurologic:  Coordination is within normal limits. DTR's are intact and symmetric throughout. Sensation is within normal limits throughout in all four extremities to deep pressure and light touch. Gait is not within normal limits. Difficulty with heel walking, toe walking and tandem walking. She has a slow gate. Long Tract Signs: SLR: Negative. Spurling's Maneuver: Negative. Plantar Response: Downgoing bilaterally. Walter sign is negative bilaterally  Trigger Points: multiple trigger points noted over the cervical paraspinals and trapezii. DATA REVIEW:   XR RIBS 5/24/2022:   RIBS FINDINGS:         There are no lytic or sclerotic bone lesions.        The Referral Priority:   Routine     Referral Type:   Eval and Treat     Referral Reason:   Specialty Services Required     Requested Specialty:   Physical Therapist     Number of Visits Requested:   1     She has a phobia of needles. She declined trigger point injections. She is also not open to MBBs. She would benefit from massage. Her neck pain is really her worse complaint. She does have significant arthritis. She would benefit from a trial of PT. If no improvement with PT, she may be a good candidate for Pain Management - we discussed low dose Norco.     Follow up:  Return for Follow up with NP in 2 month, P.T. Internal Referral.  The patient will follow up for reevaluation of her pain. The patient is aware of the treatment plan and in agreement. All of her questions were answered.      Meghana Castellano MD

## 2022-11-04 ENCOUNTER — TELEPHONE (OUTPATIENT)
Dept: FAMILY MEDICINE CLINIC | Age: 62
End: 2022-11-04

## 2022-11-04 NOTE — TELEPHONE ENCOUNTER
----- Message from Omar Harrell sent at 11/4/2022 11:52 AM EDT -----  Subject: Appointment Request    Reason for Call: Established Patient Appointment needed: Routine Physical   Exam    QUESTIONS    Reason for appointment request? Available appointments did not meet   patient need     Additional Information for Provider? Patient is calling to schedule a   physical. She mentioned wanting to have it some time soon.  Please contact   the patient to schedule.   ---------------------------------------------------------------------------  --------------  Jhon Valencia INFO  [phone]; OK to leave message on voicemail  ---------------------------------------------------------------------------  --------------  SCRIPT ANSWERS  COVID Screen: Gomez Kaur

## 2022-11-22 ENCOUNTER — OFFICE VISIT (OUTPATIENT)
Dept: FAMILY MEDICINE CLINIC | Age: 62
End: 2022-11-22
Payer: MEDICAID

## 2022-11-22 VITALS
DIASTOLIC BLOOD PRESSURE: 80 MMHG | TEMPERATURE: 97.8 F | WEIGHT: 130 LBS | RESPIRATION RATE: 16 BRPM | HEART RATE: 72 BPM | HEIGHT: 60 IN | SYSTOLIC BLOOD PRESSURE: 130 MMHG | BODY MASS INDEX: 25.52 KG/M2 | OXYGEN SATURATION: 99 %

## 2022-11-22 DIAGNOSIS — E78.5 HYPERLIPIDEMIA, UNSPECIFIED HYPERLIPIDEMIA TYPE: ICD-10-CM

## 2022-11-22 DIAGNOSIS — Z00.00 ANNUAL PHYSICAL EXAM: Primary | ICD-10-CM

## 2022-11-22 DIAGNOSIS — E55.9 VITAMIN D DEFICIENCY: ICD-10-CM

## 2022-11-22 DIAGNOSIS — M54.16 LUMBAR RADICULAR PAIN: ICD-10-CM

## 2022-11-22 DIAGNOSIS — E04.1 THYROID NODULE: ICD-10-CM

## 2022-11-22 PROCEDURE — G8484 FLU IMMUNIZE NO ADMIN: HCPCS | Performed by: NURSE PRACTITIONER

## 2022-11-22 PROCEDURE — 99396 PREV VISIT EST AGE 40-64: CPT | Performed by: NURSE PRACTITIONER

## 2022-11-22 RX ORDER — ATORVASTATIN CALCIUM 40 MG/1
40 TABLET, FILM COATED ORAL DAILY
Qty: 30 TABLET | Refills: 3 | Status: SHIPPED | OUTPATIENT
Start: 2022-11-22

## 2022-11-22 ASSESSMENT — ENCOUNTER SYMPTOMS
CHEST TIGHTNESS: 0
DIARRHEA: 0
TROUBLE SWALLOWING: 0
SHORTNESS OF BREATH: 0
BLOOD IN STOOL: 0
VOMITING: 0
NAUSEA: 0
CONSTIPATION: 0
WHEEZING: 0
COUGH: 0
ABDOMINAL PAIN: 0
SORE THROAT: 0
BACK PAIN: 1
EYES NEGATIVE: 1

## 2022-11-22 NOTE — PROGRESS NOTES
(CERVIX STATUS UNKNOWN)      OVARY REMOVAL          Family History   Problem Relation Age of Onset    Arthritis Mother     Depression Mother     Heart Disease Mother     High Blood Pressure Mother     Diabetes Father     High Blood Pressure Father     Arthritis Father     Cancer Father         prostate cancer    Breast Cancer Paternal Aunt     Cancer Paternal Grandfather         prostate cancer    Colon Cancer Son     Cancer Maternal Cousin         breast cancer    Breast Cancer Maternal Cousin      Social History     Socioeconomic History    Marital status: Single     Spouse name: Not on file    Number of children: Not on file    Years of education: Not on file    Highest education level: Not on file   Occupational History    Not on file   Tobacco Use    Smoking status: Every Day     Packs/day: 1.00     Years: 30.00     Pack years: 30.00     Types: Cigarettes    Smokeless tobacco: Never    Tobacco comments:     trying to cut back   Vaping Use    Vaping Use: Never used   Substance and Sexual Activity    Alcohol use: Not Currently     Comment: ocassional    Drug use: Yes     Types: Marijuana Shama Yosvany)     Comment: daily    Sexual activity: Not Currently   Other Topics Concern    Not on file   Social History Narrative    Not on file     Social Determinants of Health     Financial Resource Strain: Low Risk     Difficulty of Paying Living Expenses: Not hard at all   Food Insecurity: No Food Insecurity    Worried About Running Out of Food in the Last Year: Never true    Ran Out of Food in the Last Year: Never true   Transportation Needs: Not on file   Physical Activity: Not on file   Stress: Not on file   Social Connections: Not on file   Intimate Partner Violence: Not on file   Housing Stability: Not on file     Allergies:  Patient has no known allergies. Review of Systems   Constitutional: Negative. Negative for chills, diaphoresis, fatigue and fever. HENT: Negative.   Negative for congestion, sore throat and trouble swallowing. Eyes: Negative. Respiratory:  Negative for cough, chest tightness, shortness of breath and wheezing. Cardiovascular:  Negative for chest pain, palpitations and leg swelling. Gastrointestinal:  Negative for abdominal pain, blood in stool, constipation, diarrhea, nausea and vomiting. Endocrine: Negative. Genitourinary: Negative. Negative for difficulty urinating. Musculoskeletal:  Positive for arthralgias and back pain. Negative for gait problem, joint swelling and myalgias. Skin: Negative. Neurological:  Negative for dizziness, syncope, speech difficulty, weakness, light-headedness and headaches. Psychiatric/Behavioral: Negative. Objective:    /80 (Site: Right Upper Arm, Position: Sitting, Cuff Size: Medium Adult)   Pulse 72   Temp 97.8 °F (36.6 °C) (Infrared)   Resp 16   Ht 5' (1.524 m)   Wt 130 lb (59 kg)   LMP  (LMP Unknown)   SpO2 99%   BMI 25.39 kg/m²     Physical Exam  Constitutional:       General: She is not in acute distress. Appearance: She is well-developed. She is not diaphoretic. HENT:      Head: Normocephalic and atraumatic. Right Ear: External ear normal.      Left Ear: External ear normal.      Mouth/Throat:      Mouth: Mucous membranes are moist.   Eyes:      Extraocular Movements: Extraocular movements intact. Conjunctiva/sclera: Conjunctivae normal.      Pupils: Pupils are equal, round, and reactive to light. Cardiovascular:      Rate and Rhythm: Normal rate and regular rhythm. Heart sounds: Normal heart sounds. Pulmonary:      Effort: Pulmonary effort is normal. No respiratory distress. Breath sounds: Normal breath sounds. No wheezing. Abdominal:      General: Bowel sounds are normal.      Palpations: Abdomen is soft. Tenderness: There is no abdominal tenderness. Musculoskeletal:         General: Normal range of motion. Cervical back: Normal range of motion and neck supple. No tenderness. Lumbar back: Tenderness present. Normal range of motion. Negative right straight leg raise test and negative left straight leg raise test.      Right lower leg: No edema. Left lower leg: No edema. Lymphadenopathy:      Cervical: No cervical adenopathy. Skin:     General: Skin is warm and dry. Findings: No rash. Neurological:      General: No focal deficit present. Mental Status: She is alert and oriented to person, place, and time. Psychiatric:         Mood and Affect: Mood normal.         Behavior: Behavior normal.         Thought Content: Thought content normal.         Judgment: Judgment normal.       Assessment & Plan:       Diagnosis Orders   1. Annual physical exam        2. Hyperlipidemia, unspecified hyperlipidemia type  atorvastatin (LIPITOR) 40 MG tablet    CBC with Auto Differential    Comprehensive Metabolic Panel    Lipid Panel    Hemoglobin A1C      3. Thyroid nodule  US HEAD NECK SOFT TISSUE THYROID    TSH      4. Lumbar radicular pain  Amb External Referral To Pain Medicine      5. Vitamin D deficiency  Vitamin D 25 Hydroxy        Orders Placed This Encounter   Procedures    US HEAD NECK SOFT TISSUE THYROID     This procedure can be scheduled via [facility]. Access your [facility] account by visiting [email or web address].      Standing Status:   Future     Standing Expiration Date:   11/22/2023    CBC with Auto Differential     Standing Status:   Future     Standing Expiration Date:   11/22/2023    Comprehensive Metabolic Panel     Standing Status:   Future     Standing Expiration Date:   11/22/2023    Lipid Panel     Standing Status:   Future     Standing Expiration Date:   11/22/2023     Order Specific Question:   Is Patient Fasting?/# of Hours     Answer:   8    TSH     Standing Status:   Future     Standing Expiration Date:   11/22/2023    Hemoglobin A1C     Standing Status:   Future     Standing Expiration Date:   11/22/2023    Vitamin D 25 Hydroxy     Standing Status:   Future Standing Expiration Date:   11/22/2023    Amb External Referral To Pain Medicine     Referral Priority:   Routine     Referral Type:   Eval and Treat     Referred to Provider:   Abigail Stinson PA-C     Requested Specialty:   Physician Assistant     Number of Visits Requested:   1     Orders Placed This Encounter   Medications    atorvastatin (LIPITOR) 40 MG tablet     Sig: Take 1 tablet by mouth daily     Dispense:  30 tablet     Refill:  3     Medications Discontinued During This Encounter   Medication Reason    atorvastatin (LIPITOR) 40 MG tablet REORDER     Return for PCP follow-up-after testing. Reviewed with the patient: currentclinical status, medications, activities and diet. Side effects, adverse effects of the medicationprescribed today, as well as treatment plan/ rationale and result expectations havebeen discussed with the patient who expresses understanding and desires to proceed. Pt instructions reviewed and given to patient. Close follow up to evaluate treatment resultsand for coordination of care. I have reviewed the patient's medical historyin detail and updated the computerized patient record.     Serge Solares, CHRISTINE - CNP

## 2022-12-02 ENCOUNTER — HOSPITAL ENCOUNTER (OUTPATIENT)
Dept: ULTRASOUND IMAGING | Age: 62
Discharge: HOME | End: 2022-12-02
Payer: MEDICAID

## 2022-12-02 DIAGNOSIS — E04.1 THYROID NODULE: ICD-10-CM

## 2022-12-02 PROCEDURE — 76536 US EXAM OF HEAD AND NECK: CPT

## 2022-12-06 ENCOUNTER — OFFICE VISIT (OUTPATIENT)
Dept: FAMILY MEDICINE CLINIC | Age: 62
End: 2022-12-06
Payer: MEDICAID

## 2022-12-06 VITALS
HEART RATE: 76 BPM | BODY MASS INDEX: 25.52 KG/M2 | DIASTOLIC BLOOD PRESSURE: 70 MMHG | OXYGEN SATURATION: 98 % | HEIGHT: 60 IN | WEIGHT: 130 LBS | SYSTOLIC BLOOD PRESSURE: 118 MMHG | RESPIRATION RATE: 14 BRPM

## 2022-12-06 DIAGNOSIS — F17.200 NICOTINE DEPENDENCE, UNCOMPLICATED, UNSPECIFIED NICOTINE PRODUCT TYPE: ICD-10-CM

## 2022-12-06 DIAGNOSIS — E55.9 VITAMIN D DEFICIENCY: ICD-10-CM

## 2022-12-06 DIAGNOSIS — E78.5 HYPERLIPIDEMIA, UNSPECIFIED HYPERLIPIDEMIA TYPE: Primary | ICD-10-CM

## 2022-12-06 DIAGNOSIS — E04.1 THYROID NODULE: ICD-10-CM

## 2022-12-06 DIAGNOSIS — F41.9 ANXIETY: ICD-10-CM

## 2022-12-06 DIAGNOSIS — E78.5 HYPERLIPIDEMIA, UNSPECIFIED HYPERLIPIDEMIA TYPE: ICD-10-CM

## 2022-12-06 LAB
ALBUMIN SERPL-MCNC: 4.3 G/DL (ref 3.5–4.6)
ALP BLD-CCNC: 108 U/L (ref 40–130)
ALT SERPL-CCNC: 10 U/L (ref 0–33)
ANION GAP SERPL CALCULATED.3IONS-SCNC: 18 MEQ/L (ref 9–15)
AST SERPL-CCNC: 21 U/L (ref 0–35)
BASOPHILS ABSOLUTE: 0 K/UL (ref 0–0.2)
BASOPHILS RELATIVE PERCENT: 0.7 %
BILIRUB SERPL-MCNC: <0.2 MG/DL (ref 0.2–0.7)
BUN BLDV-MCNC: 11 MG/DL (ref 8–23)
CALCIUM SERPL-MCNC: 9.5 MG/DL (ref 8.5–9.9)
CHLORIDE BLD-SCNC: 106 MEQ/L (ref 95–107)
CHOLESTEROL, TOTAL: 280 MG/DL (ref 0–199)
CO2: 16 MEQ/L (ref 20–31)
CREAT SERPL-MCNC: 0.7 MG/DL (ref 0.5–0.9)
EOSINOPHILS ABSOLUTE: 0 K/UL (ref 0–0.7)
EOSINOPHILS RELATIVE PERCENT: 0.5 %
GFR SERPL CREATININE-BSD FRML MDRD: >60 ML/MIN/{1.73_M2}
GLOBULIN: 3.7 G/DL (ref 2.3–3.5)
GLUCOSE BLD-MCNC: 84 MG/DL (ref 70–99)
HCT VFR BLD CALC: 44 % (ref 37–47)
HDLC SERPL-MCNC: 65 MG/DL (ref 40–59)
HEMOGLOBIN: 14.9 G/DL (ref 12–16)
LDL CHOLESTEROL CALCULATED: 202 MG/DL (ref 0–129)
LYMPHOCYTES ABSOLUTE: 1.9 K/UL (ref 1–4.8)
LYMPHOCYTES RELATIVE PERCENT: 41.7 %
MCH RBC QN AUTO: 32.2 PG (ref 27–31.3)
MCHC RBC AUTO-ENTMCNC: 33.8 % (ref 33–37)
MCV RBC AUTO: 95.3 FL (ref 79.4–94.8)
MONOCYTES ABSOLUTE: 0.3 K/UL (ref 0.2–0.8)
MONOCYTES RELATIVE PERCENT: 7.4 %
NEUTROPHILS ABSOLUTE: 2.3 K/UL (ref 1.4–6.5)
NEUTROPHILS RELATIVE PERCENT: 49.7 %
PDW BLD-RTO: 13.8 % (ref 11.5–14.5)
PLATELET # BLD: 221 K/UL (ref 130–400)
POTASSIUM SERPL-SCNC: 4.4 MEQ/L (ref 3.4–4.9)
RBC # BLD: 4.62 M/UL (ref 4.2–5.4)
SODIUM BLD-SCNC: 140 MEQ/L (ref 135–144)
TOTAL PROTEIN: 8 G/DL (ref 6.3–8)
TRIGL SERPL-MCNC: 64 MG/DL (ref 0–150)
TSH SERPL DL<=0.05 MIU/L-ACNC: 0.66 UIU/ML (ref 0.44–3.86)
WBC # BLD: 4.6 K/UL (ref 4.8–10.8)

## 2022-12-06 PROCEDURE — G8427 DOCREV CUR MEDS BY ELIG CLIN: HCPCS | Performed by: INTERNAL MEDICINE

## 2022-12-06 PROCEDURE — 99214 OFFICE O/P EST MOD 30 MIN: CPT | Performed by: INTERNAL MEDICINE

## 2022-12-06 PROCEDURE — 3017F COLORECTAL CA SCREEN DOC REV: CPT | Performed by: INTERNAL MEDICINE

## 2022-12-06 PROCEDURE — 4004F PT TOBACCO SCREEN RCVD TLK: CPT | Performed by: INTERNAL MEDICINE

## 2022-12-06 PROCEDURE — G8484 FLU IMMUNIZE NO ADMIN: HCPCS | Performed by: INTERNAL MEDICINE

## 2022-12-06 PROCEDURE — G8419 CALC BMI OUT NRM PARAM NOF/U: HCPCS | Performed by: INTERNAL MEDICINE

## 2022-12-06 RX ORDER — CITALOPRAM 20 MG/1
20 TABLET ORAL DAILY
Qty: 30 TABLET | Refills: 3 | Status: SHIPPED | OUTPATIENT
Start: 2022-12-06

## 2022-12-06 RX ORDER — NICOTINE 21 MG/24HR
1 PATCH, TRANSDERMAL 24 HOURS TRANSDERMAL DAILY
Qty: 42 PATCH | Refills: 0 | Status: SHIPPED | OUTPATIENT
Start: 2022-12-06 | End: 2023-01-17

## 2022-12-06 ASSESSMENT — ENCOUNTER SYMPTOMS
SINUS PAIN: 0
VOICE CHANGE: 0
PHOTOPHOBIA: 0
TROUBLE SWALLOWING: 0
ABDOMINAL PAIN: 0
NAUSEA: 0
CONSTIPATION: 0
COLOR CHANGE: 0
SINUS PRESSURE: 0
SORE THROAT: 0
EYE PAIN: 0
RHINORRHEA: 0
EYE DISCHARGE: 0
VOMITING: 0
FACIAL SWELLING: 0
COUGH: 0
BACK PAIN: 0
APNEA: 0
ABDOMINAL DISTENTION: 0
RECTAL PAIN: 0
BLOOD IN STOOL: 0
DIARRHEA: 0
CHEST TIGHTNESS: 0
SHORTNESS OF BREATH: 0
WHEEZING: 0
EYE REDNESS: 0
EYE ITCHING: 0

## 2022-12-06 NOTE — PROGRESS NOTES
Subjective:      Patient ID: Ector Garcia is a 65 y.o. female New patient, here for evaluation of the following chief complaint(s):  Chief Complaint   Patient presents with    Hyperlipidemia       HPI       Anxiety:Pt states that her current life circumstances are very stressful. Impaired vision: present for 2-3 months. Noted most prominently upon awekenining. Back pain: The patient previously reported back pain  2/2009-the patient had fallen on tail bone,  0/10, at night 8/10 mild relief with Ibuprofen. The pateitn staes that PT did not help her pain. Relieved by cannibas. 2/25/2022-fell    Impaired vision: The patient has been informed that she has glaucoma. She was previously referred to ophthalmology      Hyperlipidemia: pt was noted to have hyperlipidemia. Cholesterol profile on 3/10/2022:  Component      Latest Ref Rng & Units 3/10/2022          10:09 AM   CHOLESTEROL, TOTAL, [id number]      0 - 199 mg/dL 271 (H)   Triglycerides      0 - 150 mg/dL 65   HDL Cholesterol      40 - 59 mg/dL 67 (H)   LDL Calculated      0 - 129 mg/dL 191 (H)         Nicotine dependence: The patient engages in daily nicotine use. She is interested in achieving smoking cessation. At present he denies polyuria,  Polydipsia, constitutional, sinus, visual, cardiopulmonary, urologic, gastrointestinal, immunologic/hematologic, musculoskeletal, neurologic,dermatologic, or psychiatric complaints.     Current Outpatient Medications on File Prior to Visit   Medication Sig Dispense Refill    atorvastatin (LIPITOR) 40 MG tablet Take 1 tablet by mouth daily 30 tablet 3    Na Sulfate-K Sulfate-Mg Sulf (SUPREP) 17.5-3.13-1.6 GM/177ML SOLN solution As directed 1 each 0    nicotine polacrilex (NICORETTE) 2 MG lozenge Take 1 lozenge by mouth as needed for Smoking cessation 100 each 3    albuterol (PROVENTIL) (5 MG/ML) 0.5% nebulizer solution Take 1 mL by nebulization 4 times daily as needed for Wheezing 120 each 3    nicotine (NICODERM CQ) 21 MG/24HR Place 1 patch onto the skin daily 42 patch 0     No current facility-administered medications on file prior to visit. Patient has no known allergies.   Past Medical History:   Diagnosis Date    Anxiety     Cervicalgia     Chronic headaches     Chronic low back pain     Chronic low back pain     Chronic pain     Chronic scapular pain     Colon polyps     Colon polyps     COPD (chronic obstructive pulmonary disease) (HCC)     Hyperlipidemia     Insomnia     Insomnia     Lumbar radicular pain     Major depression     Nephrolithiasis     Nephrolithiasis     Osteoarthritis     Thyroid nodule     Vitamin D deficiency      Past Surgical History:   Procedure Laterality Date    BREAST BIOPSY Right 1991    benign     SECTION      COLONOSCOPY  2006    Ty Mcgrath MD    COLONOSCOPY N/A 2022    COLORECTAL CANCER SCREENING, HIGH RISK WITH POLYPECTOMY performed by Nish Cook MD at [facility] (CERVIX STATUS UNKNOWN)      OVARY REMOVAL       Social History     Socioeconomic History    Marital status: Single     Spouse name: Not on file    Number of children: Not on file    Years of education: Not on file    Highest education level: Not on file   Occupational History    Not on file   Tobacco Use    Smoking status: Every Day     Packs/day: 1.00     Years: 30.00     Pack years: 30.00     Types: Cigarettes    Smokeless tobacco: Never    Tobacco comments:     trying to cut back   Vaping Use    Vaping Use: Never used   Substance and Sexual Activity    Alcohol use: Not Currently     Comment: ocassional    Drug use: Yes     Types: Marijuana Ramila Sanchez)     Comment: daily    Sexual activity: Not Currently   Other Topics Concern    Not on file   Social History Narrative    Not on file     Social Determinants of Health     Financial Resource Strain: Low Risk     Difficulty of Paying Living Expenses: Not hard at all   Food Insecurity: No Food Insecurity    Worried About Running Out of Food in the Last Year: Never true    Ran Out of Food in the Last Year: Never true   Transportation Needs: Not on file   Physical Activity: Not on file   Stress: Not on file   Social Connections: Not on file   Intimate Partner Violence: Not on file   Housing Stability: Not on file     Family History   Problem Relation Age of Onset    Arthritis Mother     Depression Mother     Heart Disease Mother     High Blood Pressure Mother     Diabetes Father     High Blood Pressure Father     Arthritis Father     Cancer Father         prostate cancer    Breast Cancer Paternal Aunt     Cancer Paternal Grandfather         prostate cancer    Colon Cancer Son     Cancer Maternal Cousin         breast cancer    Breast Cancer Maternal Cousin          Review of Systems   Constitutional:  Negative for chills, diaphoresis, fatigue and fever. HENT:  Negative for congestion, dental problem, drooling, ear discharge, ear pain, facial swelling, hearing loss, mouth sores, nosebleeds, postnasal drip, rhinorrhea, sinus pressure, sinus pain, sneezing, sore throat, tinnitus, trouble swallowing and voice change. Eyes:  Negative for photophobia, pain, discharge, redness, itching and visual disturbance. Respiratory:  Negative for apnea, cough, chest tightness, shortness of breath and wheezing. Cardiovascular:  Negative for chest pain, palpitations and leg swelling. Gastrointestinal:  Negative for abdominal distention, abdominal pain, blood in stool, constipation, diarrhea, nausea, rectal pain and vomiting. Endocrine: Negative for cold intolerance, heat intolerance, polydipsia, polyphagia and polyuria. Genitourinary:  Negative for decreased urine volume, difficulty urinating, dysuria, flank pain, frequency, genital sores, hematuria and urgency. Musculoskeletal:  Negative for arthralgias, back pain, gait problem, joint swelling, myalgias, neck pain and neck stiffness. Skin:  Negative for color change, rash and wound. Allergic/Immunologic: Negative for environmental allergies and food allergies. Neurological:  Negative for dizziness, tremors, seizures, syncope, facial asymmetry, speech difficulty, weakness, light-headedness, numbness and headaches. Hematological:  Negative for adenopathy. Does not bruise/bleed easily. Psychiatric/Behavioral:  Negative for agitation, confusion, decreased concentration, hallucinations, self-injury, sleep disturbance and suicidal ideas. The patient is not nervous/anxious. Objective:   /70   Pulse 76   Resp 14   Ht 5' (1.524 m)   Wt 130 lb (59 kg)   LMP  (LMP Unknown)   SpO2 98%   BMI 25.39 kg/m²     Physical Exam  Constitutional:       General: She is not in acute distress. Appearance: She is well-developed. HENT:      Head: Normocephalic. Right Ear: External ear normal.      Left Ear: External ear normal.   Eyes:      Conjunctiva/sclera: Conjunctivae normal.   Neck:      Vascular: No JVD. Trachea: No tracheal deviation. Cardiovascular:      Rate and Rhythm: Normal rate and regular rhythm. Heart sounds: Normal heart sounds. Pulmonary:      Effort: Pulmonary effort is normal. No respiratory distress. Breath sounds: Normal breath sounds. No wheezing or rales. Chest:      Chest wall: No tenderness. Abdominal:      General: Bowel sounds are normal. There is no distension. Palpations: Abdomen is soft. There is no mass. Tenderness: There is no abdominal tenderness. There is no guarding or rebound. Musculoskeletal:         General: No tenderness or deformity. Cervical back: Neck supple. Skin:     General: Skin is warm and dry. Coloration: Skin is not pale. Findings: No erythema or rash. Neurological:      Mental Status: She is alert and oriented to person, place, and time. Sensory: No sensory deficit. Motor: No abnormal muscle tone. Psychiatric:         Thought Content:  Thought content normal. Judgment: Judgment normal.       Assessment:       Diagnosis Orders   1. Hyperlipidemia, unspecified hyperlipidemia type        2. Nicotine dependence, uncomplicated, unspecified nicotine product type        3. Anxiety               No results found for: LIPIDPAN, BMP, CMP, CBC, CBCAUTODIF  Plan:      Nixon Dickerson was seen today for hyperlipidemia. Diagnoses and all orders for this visit:    Hyperlipidemia, unspecified hyperlipidemia type    Nicotine dependence, uncomplicated, unspecified nicotine product type    Anxiety-trial of Celexa 20 mg-    Other orders  -     citalopram (CELEXA) 20 MG tablet; Take 1 tablet by mouth daily  -     nicotine (NICODERM CQ) 21 MG/24HR; Place 1 patch onto the skin daily    -Encourage smoking cessation. Nicotine patches ordered  Return in about 9 years (around 12/6/2031). On this date 12/06/22 I have spent 30 minutes reviewing previous notes, test results and face to face with the patient discussing the diagnosis and importance of compliance with the treatment plan. Rama Phan MD    Please note, this report has been partially produced using speech recognition software  and may cause  and /or contain errors related to that system including grammar, punctuation and spelling as well as words and phrases that may seem inappropriate. If there are questions or concerns please feel free to contact me to clarify.

## 2022-12-07 DIAGNOSIS — E78.5 HYPERLIPIDEMIA, UNSPECIFIED HYPERLIPIDEMIA TYPE: ICD-10-CM

## 2022-12-07 LAB
REASON FOR REJECTION: NORMAL
REJECTED TEST: NORMAL
VITAMIN D 25-HYDROXY: 13.9 NG/ML

## 2022-12-07 RX ORDER — ERGOCALCIFEROL 1.25 MG/1
50000 CAPSULE ORAL WEEKLY
Qty: 12 CAPSULE | Refills: 1 | Status: SHIPPED | OUTPATIENT
Start: 2022-12-07

## 2023-01-27 ASSESSMENT — ENCOUNTER SYMPTOMS
EYE ITCHING: 0
CHEST TIGHTNESS: 0
EYE REDNESS: 0
SORE THROAT: 0
BLOOD IN STOOL: 0
EYE DISCHARGE: 0
EYE PAIN: 0
WHEEZING: 0
COLOR CHANGE: 0
ABDOMINAL DISTENTION: 0
VOICE CHANGE: 0
SINUS PRESSURE: 0
CONSTIPATION: 0
PHOTOPHOBIA: 0
SINUS PAIN: 0
DIARRHEA: 0
NAUSEA: 0
FACIAL SWELLING: 0
RHINORRHEA: 0
SHORTNESS OF BREATH: 0
ABDOMINAL PAIN: 0
VOMITING: 0
APNEA: 0
BACK PAIN: 0
TROUBLE SWALLOWING: 0
COUGH: 0
RECTAL PAIN: 0

## 2023-01-27 NOTE — PROGRESS NOTES
Subjective:      Patient ID: Suzyheidy Blanc is a 65 y.o. female New patient, here for evaluation of the following chief complaint(s):  Chief Complaint   Patient presents with    Hyperlipidemia       HPI       Anxiety:Pt states that her symptoms are well controlled at this time. 2/25/2022-fell    Impaired vision: The patient has been informed that she has glaucoma. She was previously referred to ophthalmology      Hyperlipidemia: pt was noted to have hyperlipidemia. Cholesterol profile on 3/10/2022:  Component      Latest Ref Rng & Units 3/10/2022          10:09 AM   CHOLESTEROL, TOTAL, 116233      0 - 199 mg/dL 271 (H)   Triglycerides      0 - 150 mg/dL 65   HDL Cholesterol      40 - 59 mg/dL 67 (H)   LDL Calculated      0 - 129 mg/dL 191 (H)   Compliant with lipitor 40 mg daily   Hypovitaminosis D: The patient previously was noted to have hypovitaminosis D. She did not begin vitamin D replacement therapy. Nicotine dependence: The patient engages in daily nicotine use. She is interested in achieving smoking cessation. At present he denies polyuria,  Polydipsia, constitutional, sinus, visual, cardiopulmonary, urologic, gastrointestinal, immunologic/hematologic, musculoskeletal, neurologic,dermatologic, or psychiatric complaints.     Current Outpatient Medications on File Prior to Visit   Medication Sig Dispense Refill    vitamin D (ERGOCALCIFEROL) 1.25 MG (58797 UT) CAPS capsule Take 1 capsule by mouth once a week 12 capsule 1    citalopram (CELEXA) 20 MG tablet Take 1 tablet by mouth daily 30 tablet 3    nicotine (NICODERM CQ) 21 MG/24HR Place 1 patch onto the skin daily 42 patch 0    atorvastatin (LIPITOR) 40 MG tablet Take 1 tablet by mouth daily 30 tablet 3    Na Sulfate-K Sulfate-Mg Sulf (SUPREP) 17.5-3.13-1.6 GM/177ML SOLN solution As directed 1 each 0    nicotine polacrilex (NICORETTE) 2 MG lozenge Take 1 lozenge by mouth as needed for Smoking cessation 100 each 3    albuterol (PROVENTIL) (5 MG/ML) 0.5% nebulizer solution Take 1 mL by nebulization 4 times daily as needed for Wheezing 120 each 3    nicotine (NICODERM CQ) 21 MG/24HR Place 1 patch onto the skin daily 42 patch 0     No current facility-administered medications on file prior to visit. Patient has no known allergies.   Past Medical History:   Diagnosis Date    Anxiety     Cervicalgia     Chronic headaches     Chronic low back pain     Chronic low back pain     Chronic pain     Chronic scapular pain     Colon polyps     Colon polyps     COPD (chronic obstructive pulmonary disease) (HCC)     Hyperlipidemia     Insomnia     Insomnia     Lumbar radicular pain     Major depression     Nephrolithiasis     Nephrolithiasis     Osteoarthritis     Thyroid nodule     Vitamin D deficiency      Past Surgical History:   Procedure Laterality Date    BREAST BIOPSY Right     benign     SECTION      COLONOSCOPY  2006    Noah Mckeon MD    COLONOSCOPY N/A 2022    COLORECTAL CANCER SCREENING, HIGH RISK WITH POLYPECTOMY performed by Noemy Shields MD at [facility] (CERVIX STATUS UNKNOWN)      OVARY REMOVAL       Social History     Socioeconomic History    Marital status: Single     Spouse name: Not on file    Number of children: Not on file    Years of education: Not on file    Highest education level: Not on file   Occupational History    Not on file   Tobacco Use    Smoking status: Every Day     Packs/day: 1.00     Years: 30.00     Pack years: 30.00     Types: Cigarettes    Smokeless tobacco: Never    Tobacco comments:     trying to cut back   Vaping Use    Vaping Use: Never used   Substance and Sexual Activity    Alcohol use: Not Currently     Comment: ocassional    Drug use: Yes     Types: Marijuana Randy Vlad)     Comment: daily    Sexual activity: Not Currently   Other Topics Concern    Not on file   Social History Narrative    Not on file     Social Determinants of Health     Financial Resource Strain: Low Risk Difficulty of Paying Living Expenses: Not hard at all   Food Insecurity: No Food Insecurity    Worried About [address] in the Last Year: Never true    Ran Out of Food in the Last Year: Never true   Transportation Needs: Unknown    Lack of Transportation (Medical): Not on file    Lack of Transportation (Non-Medical): No   Physical Activity: Insufficiently Active    Days of Exercise per Week: 3 days    Minutes of Exercise per Session: 30 min   Stress: Not on file   Social Connections: Not on file   Intimate Partner Violence: Not on file   Housing Stability: Unknown    Unable to Pay for Housing in the Last Year: Not on file    Number of Places Lived in the Last Year: Not on file    Unstable Housing in the Last Year: No     Family History   Problem Relation Age of Onset    Arthritis Mother     Depression Mother     Heart Disease Mother     High Blood Pressure Mother     Diabetes Father     High Blood Pressure Father     Arthritis Father     Cancer Father         prostate cancer    Breast Cancer Paternal Aunt     Cancer Paternal Grandfather         prostate cancer    Colon Cancer Son     Cancer Maternal Cousin         breast cancer    Breast Cancer Maternal Cousin          Review of Systems   Constitutional:  Negative for chills, diaphoresis, fatigue and fever. HENT:  Negative for congestion, dental problem, drooling, ear discharge, ear pain, facial swelling, hearing loss, mouth sores, nosebleeds, postnasal drip, rhinorrhea, sinus pressure, sinus pain, sneezing, sore throat, tinnitus, trouble swallowing and voice change. Eyes:  Negative for photophobia, pain, discharge, redness, itching and visual disturbance. Respiratory:  Negative for apnea, cough, chest tightness, shortness of breath and wheezing. Cardiovascular:  Negative for chest pain, palpitations and leg swelling.    Gastrointestinal:  Negative for abdominal distention, abdominal pain, blood in stool, constipation, diarrhea, nausea, rectal pain and vomiting. Endocrine: Negative for cold intolerance, heat intolerance, polydipsia, polyphagia and polyuria. Genitourinary:  Negative for decreased urine volume, difficulty urinating, dysuria, flank pain, frequency, genital sores, hematuria and urgency. Musculoskeletal:  Negative for arthralgias, back pain, gait problem, joint swelling, myalgias, neck pain and neck stiffness. Skin:  Negative for color change, rash and wound. Allergic/Immunologic: Negative for environmental allergies and food allergies. Neurological:  Negative for dizziness, tremors, seizures, syncope, facial asymmetry, speech difficulty, weakness, light-headedness, numbness and headaches. Hematological:  Negative for adenopathy. Does not bruise/bleed easily. Psychiatric/Behavioral:  Negative for agitation, confusion, decreased concentration, hallucinations, self-injury, sleep disturbance and suicidal ideas. The patient is not nervous/anxious. Objective:   /70   Pulse 77   Resp 14   Ht 5' (1.524 m)   Wt 134 lb (60.8 kg)   LMP  (LMP Unknown)   SpO2 99%   BMI 26.17 kg/m²     Physical Exam  Constitutional:       General: She is not in acute distress. Appearance: She is well-developed. HENT:      Head: Normocephalic. Right Ear: External ear normal.      Left Ear: External ear normal.   Eyes:      Conjunctiva/sclera: Conjunctivae normal.   Neck:      Vascular: No JVD. Trachea: No tracheal deviation. Cardiovascular:      Rate and Rhythm: Normal rate and regular rhythm. Heart sounds: Normal heart sounds. Pulmonary:      Effort: Pulmonary effort is normal. No respiratory distress. Breath sounds: Normal breath sounds. No wheezing or rales. Chest:      Chest wall: No tenderness. Abdominal:      General: Bowel sounds are normal. There is no distension. Palpations: Abdomen is soft. There is no mass. Tenderness: There is no abdominal tenderness. There is no guarding or rebound. Musculoskeletal:         General: No tenderness or deformity. Cervical back: Neck supple. Skin:     General: Skin is warm and dry. Coloration: Skin is not pale. Findings: No erythema or rash. Neurological:      Mental Status: She is alert and oriented to person, place, and time. Sensory: No sensory deficit. Motor: No abnormal muscle tone. Psychiatric:         Thought Content: Thought content normal.         Judgment: Judgment normal.       Assessment:       Diagnosis Orders   1. Anxiety        2. Hypovitaminosis D  Vitamin D 25 Hydroxy      3. Hyperlipidemia, unspecified hyperlipidemia type  Lipid Panel             No results found for: LIPIDPAN, BMP, CMP, CBC, CBCAUTODIF  Plan:      Eduardo Rose was seen today for hyperlipidemia. Diagnoses and all orders for this visit:    Hyperlipidemia, unspecified hyperlipidemia type-Lipitor 40 mg orally daily    Nicotine dependence, uncomplicated, unspecified nicotine product type-. Encourage smoking cessation        Hypovitaminosis D-ergocalciferol    Anxiety-trial of Celexa 20 mg    Other orders  -     citalopram (CELEXA) 20 MG tablet; Take 1 tablet by mouth daily  -     nicotine (NICODERM CQ) 21 MG/24HR; Place 1 patch onto the skin daily    -Encourage smoking cessation. Nicotine patches ordered  Return in about 8 years (around 2/7/2031). On this date 02/07/23 I have spent 30 minutes reviewing previous notes, test results and face to face with the patient discussing the diagnosis and importance of compliance with the treatment plan. Gisselle Temple MD    Please note, this report has been partially produced using speech recognition software  and may cause  and /or contain errors related to that system including grammar, punctuation and spelling as well as words and phrases that may seem inappropriate. If there are questions or concerns please feel free to contact me to clarify.

## 2023-02-07 ENCOUNTER — OFFICE VISIT (OUTPATIENT)
Dept: FAMILY MEDICINE CLINIC | Age: 63
End: 2023-02-07

## 2023-02-07 ENCOUNTER — TELEPHONE (OUTPATIENT)
Dept: FAMILY MEDICINE CLINIC | Age: 63
End: 2023-02-07

## 2023-02-07 VITALS
HEIGHT: 60 IN | SYSTOLIC BLOOD PRESSURE: 130 MMHG | RESPIRATION RATE: 14 BRPM | DIASTOLIC BLOOD PRESSURE: 70 MMHG | HEART RATE: 77 BPM | BODY MASS INDEX: 26.31 KG/M2 | WEIGHT: 134 LBS | OXYGEN SATURATION: 99 %

## 2023-02-07 VITALS
WEIGHT: 134 LBS | HEIGHT: 60 IN | SYSTOLIC BLOOD PRESSURE: 130 MMHG | RESPIRATION RATE: 14 BRPM | OXYGEN SATURATION: 99 % | BODY MASS INDEX: 26.31 KG/M2 | DIASTOLIC BLOOD PRESSURE: 70 MMHG | HEART RATE: 77 BPM

## 2023-02-07 DIAGNOSIS — Z00.00 INITIAL MEDICARE ANNUAL WELLNESS VISIT: Primary | ICD-10-CM

## 2023-02-07 DIAGNOSIS — F41.9 ANXIETY: Primary | ICD-10-CM

## 2023-02-07 DIAGNOSIS — E78.5 HYPERLIPIDEMIA, UNSPECIFIED HYPERLIPIDEMIA TYPE: ICD-10-CM

## 2023-02-07 DIAGNOSIS — E55.9 HYPOVITAMINOSIS D: ICD-10-CM

## 2023-02-07 RX ORDER — ERGOCALCIFEROL 1.25 MG/1
50000 CAPSULE ORAL WEEKLY
Qty: 8 CAPSULE | Refills: 1 | Status: SHIPPED | OUTPATIENT
Start: 2023-02-07 | End: 2023-03-29

## 2023-02-07 SDOH — ECONOMIC STABILITY: HOUSING INSECURITY
IN THE LAST 12 MONTHS, WAS THERE A TIME WHEN YOU DID NOT HAVE A STEADY PLACE TO SLEEP OR SLEPT IN A SHELTER (INCLUDING NOW)?: NO

## 2023-02-07 SDOH — ECONOMIC STABILITY: FOOD INSECURITY: WITHIN THE PAST 12 MONTHS, YOU WORRIED THAT YOUR FOOD WOULD RUN OUT BEFORE YOU GOT MONEY TO BUY MORE.: NEVER TRUE

## 2023-02-07 SDOH — ECONOMIC STABILITY: INCOME INSECURITY: HOW HARD IS IT FOR YOU TO PAY FOR THE VERY BASICS LIKE FOOD, HOUSING, MEDICAL CARE, AND HEATING?: NOT HARD AT ALL

## 2023-02-07 SDOH — ECONOMIC STABILITY: FOOD INSECURITY: WITHIN THE PAST 12 MONTHS, THE FOOD YOU BOUGHT JUST DIDN'T LAST AND YOU DIDN'T HAVE MONEY TO GET MORE.: NEVER TRUE

## 2023-02-07 ASSESSMENT — PATIENT HEALTH QUESTIONNAIRE - PHQ9
8. MOVING OR SPEAKING SO SLOWLY THAT OTHER PEOPLE COULD HAVE NOTICED. OR THE OPPOSITE, BEING SO FIGETY OR RESTLESS THAT YOU HAVE BEEN MOVING AROUND A LOT MORE THAN USUAL: 0
3. TROUBLE FALLING OR STAYING ASLEEP: 0
SUM OF ALL RESPONSES TO PHQ QUESTIONS 1-9: 0
SUM OF ALL RESPONSES TO PHQ QUESTIONS 1-9: 0
1. LITTLE INTEREST OR PLEASURE IN DOING THINGS: 0
6. FEELING BAD ABOUT YOURSELF - OR THAT YOU ARE A FAILURE OR HAVE LET YOURSELF OR YOUR FAMILY DOWN: 0
10. IF YOU CHECKED OFF ANY PROBLEMS, HOW DIFFICULT HAVE THESE PROBLEMS MADE IT FOR YOU TO DO YOUR WORK, TAKE CARE OF THINGS AT HOME, OR GET ALONG WITH OTHER PEOPLE: 0
9. THOUGHTS THAT YOU WOULD BE BETTER OFF DEAD, OR OF HURTING YOURSELF: 0
SUM OF ALL RESPONSES TO PHQ QUESTIONS 1-9: 0
2. FEELING DOWN, DEPRESSED OR HOPELESS: 0
5. POOR APPETITE OR OVEREATING: 0
SUM OF ALL RESPONSES TO PHQ QUESTIONS 1-9: 0
4. FEELING TIRED OR HAVING LITTLE ENERGY: 0
SUM OF ALL RESPONSES TO PHQ9 QUESTIONS 1 & 2: 0
7. TROUBLE CONCENTRATING ON THINGS, SUCH AS READING THE NEWSPAPER OR WATCHING TELEVISION: 0

## 2023-02-07 NOTE — PATIENT INSTRUCTIONS
Preventing Falls: Care Instructions  Overview     Getting around your home safely can be a challenge if you have injuries or health problems that make it easy for you to fall. Loose rugs and furniture in walkways are among the dangers for many older people who have problems walking or who have poor eyesight. People who have conditions such as arthritis, osteoporosis, or dementia also have to be careful not to fall. You can make your home safer with a few simple measures. Follow-up care is a key part of your treatment and safety. Be sure to make and go to all appointments, and call your doctor if you are having problems. It's also a good idea to know your test results and keep a list of the medicines you take. How can you care for yourself at home? Taking care of yourself  Exercise regularly to improve your strength, muscle tone, and balance. Walk if you can. Swimming may be a good choice if you cannot walk easily. Have your vision and hearing checked each year or any time you notice a change. If you have trouble seeing and hearing, you might not be able to avoid objects and could lose your balance. Know the side effects of the medicines you take. Ask your doctor or pharmacist whether the medicines you take can affect your balance. Sleeping pills or sedatives can affect your balance. Limit the amount of alcohol you drink. Alcohol can impair your balance and other senses. Ask your doctor whether calluses or corns on your feet need to be removed. If you wear loose-fitting shoes because of calluses or corns, you can lose your balance and fall. Talk to your doctor if you have numbness in your feet. You may get dizzy if you do not drink enough water. To prevent dehydration, drink plenty of fluids. Choose water and other clear liquids. If you have kidney, heart, or liver disease and have to limit fluids, talk with your doctor before you increase the amount of fluids you drink.   Preventing falls at home  Remove raised doorway thresholds, throw rugs, and clutter. Repair loose carpet or raised areas in the floor. Move furniture and electrical cords to keep them out of walking paths. Use nonskid floor wax, and wipe up spills right away, especially on ceramic tile floors. If you use a walker or cane, put rubber tips on it. If you use crutches, clean the bottoms of them regularly with an abrasive pad, such as steel wool. Keep your house well lit, especially stairways, porches, and outside walkways. Use night-lights in areas such as hallways and bathrooms. Add extra light switches or use remote switches (such as switches that go on or off when you clap your hands) to make it easier to turn lights on if you have to get up during the night. Install sturdy handrails on stairways. Move items in your cabinets so that the things you use a lot are on the lower shelves (about waist level). Keep a cordless phone and a flashlight with new batteries by your bed. If possible, put a phone in each of the main rooms of your house, or carry a cell phone in case you fall and cannot reach a phone. Or, you can wear a device around your neck or wrist. You push a button that sends a signal for help. Wear low-heeled shoes that fit well and give your feet good support. Use footwear with nonskid soles. Check the heels and soles of your shoes for wear. Repair or replace worn heels or soles. Do not wear socks without shoes on smooth floors, such as wood. Walk on the grass when the sidewalks are slippery. If you live in an area that gets snow and ice in the winter, sprinkle salt on slippery steps and sidewalks. Or ask a family member or friend to do this for you. Preventing falls in the bath  Install grab bars and nonskid mats inside and outside your shower or tub and near the toilet and sinks. Use shower chairs and bath benches. Use a hand-held shower head that will allow you to sit while showering.   Get into a tub or shower by putting the weaker leg in first. Get out of a tub or shower with your strong side first.  Repair loose toilet seats and consider installing a raised toilet seat to make getting on and off the toilet easier. Keep your bathroom door unlocked while you are in the shower. Where can you learn more? Go to http://www.mcmahan.com/ and enter G117 to learn more about \"Preventing Falls: Care Instructions. \"  Current as of: May 4, 2022               Content Version: 13.5  © 2067-1486 Healthwise, Incorporated. Care instructions adapted under license by [facility] ([facility]). If you have questions about a medical condition or this instruction, always ask your healthcare professional. [address] any warranty or liability for your use of this information. Hearing Loss: Care Instructions  Overview     Hearing loss is a sudden or slow decrease in how well you hear. It can range from mild to severe. Permanent hearing loss can occur with aging. It also can happen when you are exposed long-term to loud noise. Examples include listening to loud music, riding motorcycles, or being around other loud machines. Hearing loss can affect your work and home life. It can make you feel lonely or depressed. You may feel that you have lost your independence. But hearing aids and other devices can help you hear better and feel connected to others. Follow-up care is a key part of your treatment and safety. Be sure to make and go to all appointments, and call your doctor if you are having problems. It's also a good idea to know your test results and keep a list of the medicines you take. How can you care for yourself at home? Avoid loud noises whenever possible. This helps keep your hearing from getting worse. Always wear hearing protection around loud noises. Wear a hearing aid as directed. See a professional who can help you pick a hearing aid that fits you. Have hearing tests as your doctor suggests. They can show whether your hearing has changed. Your hearing aid may need to be adjusted. Use other devices as needed. These may include:  Telephone amplifiers and hearing aids that can connect to a television, stereo, radio, or microphone. Devices that use lights or vibrations. These alert you to the doorbell, a ringing telephone, or a baby monitor. Television closed-captioning. This shows the words at the bottom of the screen. Most new TVs can do this. TTY (text telephone). This lets you type messages back and forth on the telephone instead of talking or listening. These devices are also called TDD. When messages are typed on the keyboard, they are sent over the phone line to a receiving TTY. The message is shown on a monitor. Use text messaging, social media, and email if it is hard for you to communicate by telephone. Try to learn a listening technique called speechreading. It is not lipreading. You pay attention to people's gestures, expressions, posture, and tone of voice. These clues can help you understand what a person is saying. Face the person you are talking to, and have them face you. Make sure the lighting is good. You need to see the other person's face clearly. Think about counseling if you need help to adjust to your hearing loss. When should you call for help? Watch closely for changes in your health, and be sure to contact your doctor if:    You think your hearing is getting worse.     You have new symptoms, such as dizziness or nausea. Where can you learn more? Go to http://www.Proformative.com/ and enter R798 to learn more about \"Hearing Loss: Care Instructions. \"  Current as of: May 4, 2022               Content Version: 13.5  © 4236-8201 Healthwise, Incorporated. Care instructions adapted under license by [facility] ([facility]).  If you have questions about a medical condition or this instruction, always ask your healthcare professional. Devin Haro any warranty or liability for your use of this information. Advance Directives: Care Instructions  Overview  An advance directive is a legal way to state your wishes at the end of your life. It tells your family and your doctor what to do if you can't say what you want. There are two main types of advance directives. You can change them any time your wishes change. Living will. This form tells your family and your doctor your wishes about life support and other treatment. The form is also called a declaration. Medical power of . This form lets you name a person to make treatment decisions for you when you can't speak for yourself. This person is called a health care agent (health care proxy, health care surrogate). The form is also called a durable power of  for health care. If you do not have an advance directive, decisions about your medical care may be made by a family member, or by a doctor or a  who doesn't know you. It may help to think of an advance directive as a gift to the people who care for you. If you have one, they won't have to make tough decisions by themselves. For more information, including forms for your state, see the [address] website (www.caringinfo.org/planning/advance-directives/). Follow-up care is a key part of your treatment and safety. Be sure to make and go to all appointments, and call your doctor if you are having problems. It's also a good idea to know your test results and keep a list of the medicines you take. What should you include in an advance directive? Many states have a unique advance directive form. (It may ask you to address specific issues.) Or you might use a universal form that's approved by many states. If your form doesn't tell you what to address, it may be hard to know what to include in your advance directive. Use the questions below to help you get started.   Who do you want to make decisions about your medical care if you are not able to? What life-support measures do you want if you have a serious illness that gets worse over time or can't be cured? What are you most afraid of that might happen? (Maybe you're afraid of having pain, losing your independence, or being kept alive by machines.)  Where would you prefer to die? (Your home? A hospital? A nursing home?)  Do you want to donate your organs when you die? Do you want certain Zoroastrianism practices performed before you die? When should you call for help? Be sure to contact your doctor if you have any questions. Where can you learn more? Go to http://www.mcmahan.com/ and enter R264 to learn more about \"Advance Directives: Care Instructions. \"  Current as of: June 16, 2022               Content Version: 13.5  © 2113-6166 Healthwise, [employer]. Care instructions adapted under license by [facility] ([facility]). If you have questions about a medical condition or this instruction, always ask your healthcare professional. [place] [address] any warranty or liability for your use of this information. A Healthy Heart: Care Instructions  Your Care Instructions     Coronary artery disease, also called heart disease, occurs when a substance called plaque builds up in the vessels that supply oxygen-rich blood to your heart muscle. This can narrow the blood vessels and reduce blood flow. A heart attack happens when blood flow is completely blocked. A high-fat diet, smoking, and other factors increase the risk of heart disease. Your doctor has found that you have a chance of having heart disease. You can do lots of things to keep your heart healthy. It may not be easy, but you can change your diet, exercise more, and quit smoking. These steps really work to lower your chance of heart disease. Follow-up care is a key part of your treatment and safety. Be sure to make and go to all appointments, and call your doctor if you are having problems.  It's also a good idea to know your test results and keep a list of the medicines you take. How can you care for yourself at home? Diet    Use less salt when you cook and eat. This helps lower your blood pressure. Taste food before salting. Add only a little salt when you think you need it. With time, your taste buds will adjust to less salt.     Eat fewer snack items, fast foods, canned soups, and other high-salt, high-fat, processed foods.     Read food labels and try to avoid saturated and trans fats. They increase your risk of heart disease by raising cholesterol levels.     Limit the amount of solid fat-butter, margarine, and shortening-you eat. Use olive, peanut, or canola oil when you cook. Bake, broil, and steam foods instead of frying them.     Eat a variety of fruit and vegetables every day. Dark green, deep orange, red, or yellow fruits and vegetables are especially good for you. Examples include spinach, carrots, peaches, and berries.     Foods high in fiber can reduce your cholesterol and provide important vitamins and minerals. High-fiber foods include whole-grain cereals and breads, oatmeal, beans, brown rice, citrus fruits, and apples.     Eat lean proteins. Heart-healthy proteins include seafood, lean meats and poultry, eggs, beans, peas, nuts, seeds, and soy products.     Limit drinks and foods with added sugar. These include candy, desserts, and soda pop. Lifestyle changes    If your doctor recommends it, get more exercise. Walking is a good choice. Bit by bit, increase the amount you walk every day. Try for at least 30 minutes on most days of the week. You also may want to swim, bike, or do other activities.     Do not smoke. If you need help quitting, talk to your doctor about stop-smoking programs and medicines. These can increase your chances of quitting for good. Quitting smoking may be the most important step you can take to protect your heart.  It is never too late to quit.     Limit alcohol to 2 drinks a day for men and 1 drink a day for women. Too much alcohol can cause health problems.     Manage other health problems such as diabetes, high blood pressure, and high cholesterol. If you think you may have a problem with alcohol or drug use, talk to your doctor. Medicines    Take your medicines exactly as prescribed. Call your doctor if you think you are having a problem with your medicine.     If your doctor recommends aspirin, take the amount directed each day. Make sure you take aspirin and not another kind of pain reliever, such as acetaminophen (Tylenol). When should you call for help? Call 911 if you have symptoms of a heart attack. These may include:    Chest pain or pressure, or a strange feeling in the chest.     Sweating.     Shortness of breath.     Pain, pressure, or a strange feeling in the back, neck, jaw, or upper belly or in one or both shoulders or arms.     Lightheadedness or sudden weakness.     A fast or irregular heartbeat. After you call 911, the  may tell you to chew 1 adult-strength or 2 to 4 low-dose aspirin. Wait for an ambulance. Do not try to drive yourself. Watch closely for changes in your health, and be sure to contact your doctor if you have any problems. Where can you learn more? Go to http://www.mcmahan.com/ and enter F075 to learn more about \"A Healthy Heart: Care Instructions. \"  Current as of: September 7, 2022               Content Version: 13.5  © 5698-4351 Healthwise, Incorporated. Care instructions adapted under license by [facility] If you have questions about a medical condition or this instruction, always ask your healthcare professional. [place] [address] any warranty or liability for your use of this information. Personalized Preventive Plan for Elana Dy - 2/7/2023  Medicare offers a range of preventive health benefits.  Some of the tests and screenings are paid in full while other may be subject to a deductible, co-insurance, and/or copay. Some of these benefits include a comprehensive review of your medical history including lifestyle, illnesses that may run in your family, and various assessments and screenings as appropriate. After reviewing your medical record and screening and assessments performed today your provider may have ordered immunizations, labs, imaging, and/or referrals for you. A list of these orders (if applicable) as well as your Preventive Care list are included within your After Visit Summary for your review. Other Preventive Recommendations:    A preventive eye exam performed by an eye specialist is recommended every 1-2 years to screen for glaucoma; cataracts, macular degeneration, and other eye disorders. A preventive dental visit is recommended every 6 months. Try to get at least 150 minutes of exercise per week or 10,000 steps per day on a pedometer . Order or download the FREE \"Exercise & Physical Activity: Your Everyday Guide\" from [employer]. Call 2-350.218.9389 or search [employer] online. You need 9954-1665 mg of calcium and 4713-3606 IU of vitamin D per day. It is possible to meet your calcium requirement with diet alone, but a vitamin D supplement is usually necessary to meet this goal.  When exposed to the sun, use a sunscreen that protects against both UVA and UVB radiation with an SPF of 30 or greater. Reapply every 2 to 3 hours or after sweating, drying off with a towel, or swimming. Always wear a seat belt when traveling in a car. Always wear a helmet when riding a bicycle or motorcycle.

## 2023-02-07 NOTE — PROGRESS NOTES
Medicare Annual Wellness Visit    Snehal Courtney is here for Medicare AWV    Assessment & Plan   Initial Medicare annual wellness visit      Recommendations for Preventive Services Due: see orders and patient instructions/AVS.  Recommended screening schedule for the next 5-10 years is provided to the patient in written form: see Patient Instructions/AVS.     Return for Medicare Annual Wellness Visit in 1 year. Subjective   The following acute and/or chronic problems were also addressed today:    Patient's complete Health Risk Assessment and screening values have been reviewed and are found in Flowsheets. The following problems were reviewed today and where indicated follow up appointments were made and/or referrals ordered.     Positive Risk Factor Screenings with Interventions:    Fall Risk:  Do you feel unsteady or are you worried about falling? : no  2 or more falls in past year?: no  Fall with injury in past year?: (!) yes            Drug Use:          Interpretation:  1-2: Low level - Monitor, re-assess at a later date  3-5: Moderate level - Further Investigation  6-8: Substantial level - Intensive Assessment  9-10: Severe level - Intensive Assessment               Dentist Screen:  Have you seen the dentist within the past year?: (!) No            Advanced Directives:  Do you have a Living Will?: (!) No    Intervention:  nonoe        Tobacco Use:  Tobacco Use: High Risk    Smoking Tobacco Use: Every Day    Smokeless Tobacco Use: Never    Passive Exposure: Not on file     E-cigarette/Vaping       Questions Responses    E-cigarette/Vaping Use Never User    Start Date     Passive Exposure     Quit Date     Counseling Given     Comments           Interventions:  Patient advised to follow up in the office for further evalution and treatment                      Objective   Vitals:    02/07/23 1131   BP: 130/70   Pulse: 77   Resp: 14   SpO2: 99%   Weight: 134 lb (60.8 kg)   Height: 5' (1.524 m)      Body mass index is 26.17 kg/m². General Appearance: alert and oriented to person, place and time, well developed and well- nourished, in no acute distress  Skin: warm and dry, no rash or erythema  Head: normocephalic and atraumatic  Eyesextraocular eye movements intact, conjunctivae normal  ENT: external ear and ear canal normal bilaterally, nose without deformity, nasal mucosa and turbinates normal without polyps  Neck: supple and non-tender without mass, no thyromegaly or thyroid nodules, no cervical lymphadenopathy  Pulmonary/Chest: clear to auscultation bilaterally- no wheezes, rales or rhonchi, normal air movement, no respiratory distress  Cardiovascular: normal rate, regular rhythm, normal S1 and S2, no murmurs, rubs, clicks, or gallops, distal pulses intact, no carotid bruits  Abdomen: soft, non-tender, non-distended, normal bowel sounds, no masses or organomegaly  Extremities: no cyanosis, clubbing or edema  Musculoskeletal: normal range of motion, no joint swelling, deformity or tenderness  Neurologic: no cranial nerve deficit, gait, coordination and speech normal       No Known Allergies  Prior to Visit Medications    Medication Sig Taking?  Authorizing Provider   vitamin D (ERGOCALCIFEROL) 1.25 MG (37556 UT) CAPS capsule Take 1 capsule by mouth once a week for 8 doses  Gerda Perez MD   vitamin D (ERGOCALCIFEROL) 1.25 MG (28163 UT) CAPS capsule Take 1 capsule by mouth once a week  CHRISTINE Silvestre CNP   citalopram (CELEXA) 20 MG tablet Take 1 tablet by mouth daily  Gerda Perez MD   nicotine (NICODERM CQ) 21 MG/24HR Place 1 patch onto the skin daily  Gerda Perez MD   atorvastatin (LIPITOR) 40 MG tablet Take 1 tablet by mouth daily  CHRISTINE Silvestre CNP   Na Sulfate-K Sulfate-Mg Sulf (SUPREP) 17.5-3.13-1.6 GM/177ML SOLN solution As directed  Maylene Nyhan, MD   nicotine polacrilex (NICORETTE) 2 MG lozenge Take 1 lozenge by mouth as needed for Smoking cessation  Gerda Perez MD albuterol (PROVENTIL) (5 MG/ML) 0.5% nebulizer solution Take 1 mL by nebulization 4 times daily as needed for Wheezing  Roger Lindsay MD   nicotine (NICODERM CQ) 21 MG/24HR Place 1 patch onto the skin daily  Roger Lindsay MD       CareTeam (Including outside providers/suppliers regularly involved in providing care):   Patient Care Team:  Roger iLndsay MD as PCP - General (Internal Medicine)  Roger Lindsay MD as PCP - Empaneled Provider     Reviewed and updated this visit:  Allergies  Meds              Roger Lindsay MD

## 2023-03-28 DIAGNOSIS — E78.5 HYPERLIPIDEMIA, UNSPECIFIED HYPERLIPIDEMIA TYPE: ICD-10-CM

## 2023-03-28 RX ORDER — ATORVASTATIN CALCIUM 40 MG/1
40 TABLET, FILM COATED ORAL DAILY
Qty: 30 TABLET | Refills: 3 | Status: SHIPPED | OUTPATIENT
Start: 2023-03-28

## 2024-01-26 ENCOUNTER — TELEMEDICINE (OUTPATIENT)
Dept: FAMILY MEDICINE CLINIC | Age: 64
End: 2024-01-26

## 2024-01-26 DIAGNOSIS — Z00.00 MEDICARE ANNUAL WELLNESS VISIT, SUBSEQUENT: Primary | ICD-10-CM

## 2024-01-26 DIAGNOSIS — E78.5 HYPERLIPIDEMIA, UNSPECIFIED HYPERLIPIDEMIA TYPE: ICD-10-CM

## 2024-01-26 RX ORDER — ATORVASTATIN CALCIUM 40 MG/1
40 TABLET, FILM COATED ORAL DAILY
Qty: 30 TABLET | Refills: 3 | Status: SHIPPED | OUTPATIENT
Start: 2024-01-26

## 2024-01-26 ASSESSMENT — PATIENT HEALTH QUESTIONNAIRE - PHQ9
10. IF YOU CHECKED OFF ANY PROBLEMS, HOW DIFFICULT HAVE THESE PROBLEMS MADE IT FOR YOU TO DO YOUR WORK, TAKE CARE OF THINGS AT HOME, OR GET ALONG WITH OTHER PEOPLE: 0
2. FEELING DOWN, DEPRESSED OR HOPELESS: 0
7. TROUBLE CONCENTRATING ON THINGS, SUCH AS READING THE NEWSPAPER OR WATCHING TELEVISION: 0
SUM OF ALL RESPONSES TO PHQ QUESTIONS 1-9: 0
6. FEELING BAD ABOUT YOURSELF - OR THAT YOU ARE A FAILURE OR HAVE LET YOURSELF OR YOUR FAMILY DOWN: 0
1. LITTLE INTEREST OR PLEASURE IN DOING THINGS: 0
SUM OF ALL RESPONSES TO PHQ QUESTIONS 1-9: 0
4. FEELING TIRED OR HAVING LITTLE ENERGY: 0
3. TROUBLE FALLING OR STAYING ASLEEP: 0
SUM OF ALL RESPONSES TO PHQ QUESTIONS 1-9: 0
9. THOUGHTS THAT YOU WOULD BE BETTER OFF DEAD, OR OF HURTING YOURSELF: 0
5. POOR APPETITE OR OVEREATING: 0
8. MOVING OR SPEAKING SO SLOWLY THAT OTHER PEOPLE COULD HAVE NOTICED. OR THE OPPOSITE, BEING SO FIGETY OR RESTLESS THAT YOU HAVE BEEN MOVING AROUND A LOT MORE THAN USUAL: 0
SUM OF ALL RESPONSES TO PHQ9 QUESTIONS 1 & 2: 0
SUM OF ALL RESPONSES TO PHQ QUESTIONS 1-9: 0

## 2024-01-26 ASSESSMENT — LIFESTYLE VARIABLES
HOW OFTEN DO YOU HAVE A DRINK CONTAINING ALCOHOL: NEVER
HOW MANY STANDARD DRINKS CONTAINING ALCOHOL DO YOU HAVE ON A TYPICAL DAY: PATIENT DOES NOT DRINK

## 2024-01-26 NOTE — PATIENT INSTRUCTIONS
have symptoms of a heart attack. These may include:    Chest pain or pressure, or a strange feeling in the chest.     Sweating.     Shortness of breath.     Pain, pressure, or a strange feeling in the back, neck, jaw, or upper belly or in one or both shoulders or arms.     Lightheadedness or sudden weakness.     A fast or irregular heartbeat.   After you call 911, the  may tell you to chew 1 adult-strength or 2 to 4 low-dose aspirin. Wait for an ambulance. Do not try to drive yourself.  Watch closely for changes in your health, and be sure to contact your doctor if you have any problems.  Where can you learn more?  Go to https://www.Truist.net/patientEd and enter F075 to learn more about \"A Healthy Heart: Care Instructions.\"  Current as of: June 25, 2023               Content Version: 13.9  © 6356-9015 [employer].   Care instructions adapted under license by [employer]. If you have questions about a medical condition or this instruction, always ask your healthcare professional. [employer] disclaims any warranty or liability for your use of this information.      Personalized Preventive Plan for Yulisa Ba - 1/26/2024  Medicare offers a range of preventive health benefits. Some of the tests and screenings are paid in full while other may be subject to a deductible, co-insurance, and/or copay.    Some of these benefits include a comprehensive review of your medical history including lifestyle, illnesses that may run in your family, and various assessments and screenings as appropriate.    After reviewing your medical record and screening and assessments performed today your provider may have ordered immunizations, labs, imaging, and/or referrals for you.  A list of these orders (if applicable) as well as your Preventive Care list are included within your After Visit Summary for your review.    Other Preventive Recommendations:    A preventive eye exam performed by an eye

## 2024-01-26 NOTE — PROGRESS NOTES
Medicare Annual Wellness Visit    Yulisa Ba is here for Medicare AWV    Assessment & Plan   Medicare annual wellness visit, subsequent    Recommendations for Preventive Services Due: see orders and patient instructions/AVS.  Recommended screening schedule for the next 5-10 years is provided to the patient in written form: see Patient Instructions/AVS.     Return in 1 year (on 1/26/2025).     Subjective       Patient's complete Health Risk Assessment and screening values have been reviewed and are found in Flowsheets. The following problems were reviewed today and where indicated follow up appointments were made and/or referrals ordered.    Positive Risk Factor Screenings with Interventions:                 Dentist Screen:  Have you seen the dentist within the past year?: (!) No    Intervention:  Patient declines any further evaluation or treatment        Advanced Directives:  Do you have a Living Will?: (!) No    Intervention:  has NO advanced directive - not interested in additional information       Lung Cancer Screening:                    Objective      Patient-Reported Vitals  No data recorded          No Known Allergies  Prior to Visit Medications    Medication Sig Taking? Authorizing Provider   vitamin D (ERGOCALCIFEROL) 1.25 MG (81371 UT) CAPS capsule Take 1 capsule by mouth once a week for 8 doses Yes Orestes Whitaker MD   vitamin D (ERGOCALCIFEROL) 1.25 MG (74324 UT) CAPS capsule Take 1 capsule by mouth once a week Yes Peg Price APRN - CNP   citalopram (CELEXA) 20 MG tablet Take 1 tablet by mouth daily Yes Orestes Whitaker MD   Na Sulfate-K Sulfate-Mg Sulf (SUPREP) 17.5-3.13-1.6 GM/177ML SOLN solution As directed Yes Jamel Larson MD   nicotine polacrilex (NICORETTE) 2 MG lozenge Take 1 lozenge by mouth as needed for Smoking cessation Yes Orestes Whitaker MD   albuterol (PROVENTIL) (5 MG/ML) 0.5% nebulizer solution Take 1 mL by nebulization 4 times daily as needed for Wheezing Yes Sherman

## 2024-02-14 DIAGNOSIS — E55.9 VITAMIN D DEFICIENCY: ICD-10-CM

## 2024-02-14 DIAGNOSIS — E04.1 THYROID NODULE: ICD-10-CM

## 2024-02-14 DIAGNOSIS — E78.5 HYPERLIPIDEMIA, UNSPECIFIED HYPERLIPIDEMIA TYPE: Primary | ICD-10-CM

## 2024-02-14 DIAGNOSIS — E55.9 HYPOVITAMINOSIS D: ICD-10-CM

## 2024-03-12 ENCOUNTER — OFFICE VISIT (OUTPATIENT)
Dept: FAMILY MEDICINE CLINIC | Age: 64
End: 2024-03-12
Payer: MEDICARE

## 2024-03-12 VITALS
OXYGEN SATURATION: 97 % | RESPIRATION RATE: 12 BRPM | HEART RATE: 89 BPM | TEMPERATURE: 97.2 F | DIASTOLIC BLOOD PRESSURE: 68 MMHG | SYSTOLIC BLOOD PRESSURE: 108 MMHG | HEIGHT: 60 IN | BODY MASS INDEX: 27.17 KG/M2 | WEIGHT: 138.4 LBS

## 2024-03-12 DIAGNOSIS — F41.9 ANXIETY: Primary | ICD-10-CM

## 2024-03-12 DIAGNOSIS — E78.5 HYPERLIPIDEMIA, UNSPECIFIED HYPERLIPIDEMIA TYPE: ICD-10-CM

## 2024-03-12 PROCEDURE — 99214 OFFICE O/P EST MOD 30 MIN: CPT | Performed by: INTERNAL MEDICINE

## 2024-03-12 SDOH — ECONOMIC STABILITY: FOOD INSECURITY: WITHIN THE PAST 12 MONTHS, THE FOOD YOU BOUGHT JUST DIDN'T LAST AND YOU DIDN'T HAVE MONEY TO GET MORE.: NEVER TRUE

## 2024-03-12 SDOH — ECONOMIC STABILITY: FOOD INSECURITY: WITHIN THE PAST 12 MONTHS, YOU WORRIED THAT YOUR FOOD WOULD RUN OUT BEFORE YOU GOT MONEY TO BUY MORE.: NEVER TRUE

## 2024-03-12 SDOH — ECONOMIC STABILITY: INCOME INSECURITY: HOW HARD IS IT FOR YOU TO PAY FOR THE VERY BASICS LIKE FOOD, HOUSING, MEDICAL CARE, AND HEATING?: NOT HARD AT ALL

## 2024-04-01 DIAGNOSIS — E04.1 THYROID NODULE: ICD-10-CM

## 2024-04-01 DIAGNOSIS — E78.5 HYPERLIPIDEMIA, UNSPECIFIED HYPERLIPIDEMIA TYPE: ICD-10-CM

## 2024-04-01 DIAGNOSIS — E55.9 HYPOVITAMINOSIS D: ICD-10-CM

## 2024-04-01 LAB
ALBUMIN SERPL-MCNC: 4.3 G/DL (ref 3.5–4.6)
ALP SERPL-CCNC: 102 U/L (ref 40–130)
ALT SERPL-CCNC: 12 U/L (ref 0–33)
ANION GAP SERPL CALCULATED.3IONS-SCNC: 11 MEQ/L (ref 9–15)
AST SERPL-CCNC: 15 U/L (ref 0–35)
BASOPHILS # BLD: 0 K/UL (ref 0–0.2)
BASOPHILS NFR BLD: 0.4 %
BILIRUB SERPL-MCNC: <0.2 MG/DL (ref 0.2–0.7)
BUN SERPL-MCNC: 12 MG/DL (ref 8–23)
CALCIUM SERPL-MCNC: 9.5 MG/DL (ref 8.5–9.9)
CHLORIDE SERPL-SCNC: 106 MEQ/L (ref 95–107)
CHOLEST SERPL-MCNC: 237 MG/DL (ref 0–199)
CO2 SERPL-SCNC: 26 MEQ/L (ref 20–31)
CREAT SERPL-MCNC: 0.72 MG/DL (ref 0.5–0.9)
EOSINOPHIL # BLD: 0 K/UL (ref 0–0.7)
EOSINOPHIL NFR BLD: 0.4 %
ERYTHROCYTE [DISTWIDTH] IN BLOOD BY AUTOMATED COUNT: 13 % (ref 11.5–14.5)
GLOBULIN SER CALC-MCNC: 3.5 G/DL (ref 2.3–3.5)
GLUCOSE SERPL-MCNC: 97 MG/DL (ref 70–99)
HBA1C MFR BLD: 5.8 % (ref 4.8–5.9)
HCT VFR BLD AUTO: 44.6 % (ref 37–47)
HDLC SERPL-MCNC: 59 MG/DL (ref 40–59)
HGB BLD-MCNC: 14.5 G/DL (ref 12–16)
LDL CHOLESTEROL CALCULATED: 158 MG/DL (ref 0–129)
LYMPHOCYTES # BLD: 2.5 K/UL (ref 1–4.8)
LYMPHOCYTES NFR BLD: 43.1 %
MCH RBC QN AUTO: 30.5 PG (ref 27–31.3)
MCHC RBC AUTO-ENTMCNC: 32.5 % (ref 33–37)
MCV RBC AUTO: 93.7 FL (ref 79.4–94.8)
MONOCYTES # BLD: 0.3 K/UL (ref 0.2–0.8)
MONOCYTES NFR BLD: 5.8 %
NEUTROPHILS # BLD: 2.9 K/UL (ref 1.4–6.5)
NEUTS SEG NFR BLD: 50.1 %
PLATELET # BLD AUTO: 292 K/UL (ref 130–400)
POTASSIUM SERPL-SCNC: 4.5 MEQ/L (ref 3.4–4.9)
PROT SERPL-MCNC: 7.8 G/DL (ref 6.3–8)
RBC # BLD AUTO: 4.76 M/UL (ref 4.2–5.4)
SODIUM SERPL-SCNC: 143 MEQ/L (ref 135–144)
TRIGLYCERIDE, FASTING: 100 MG/DL (ref 0–150)
TSH SERPL-MCNC: 0.48 UIU/ML (ref 0.44–3.86)
VITAMIN D 25-HYDROXY: 17.6 NG/ML (ref 30–100)
WBC # BLD AUTO: 5.7 K/UL (ref 4.8–10.8)

## 2024-04-01 RX ORDER — ERGOCALCIFEROL 1.25 MG/1
50000 CAPSULE ORAL WEEKLY
Qty: 8 CAPSULE | Refills: 1 | Status: SHIPPED | OUTPATIENT
Start: 2024-04-01 | End: 2024-07-16

## 2024-04-01 ASSESSMENT — ENCOUNTER SYMPTOMS
RHINORRHEA: 0
ABDOMINAL DISTENTION: 0
SORE THROAT: 0
ABDOMINAL PAIN: 0
WHEEZING: 0
VOICE CHANGE: 0
COUGH: 0
COLOR CHANGE: 0
SHORTNESS OF BREATH: 0
RECTAL PAIN: 0
CHEST TIGHTNESS: 0
EYE REDNESS: 0
BLOOD IN STOOL: 0
EYE DISCHARGE: 0
FACIAL SWELLING: 0
CONSTIPATION: 0
TROUBLE SWALLOWING: 0
EYE PAIN: 0
DIARRHEA: 0
VOMITING: 0
EYE ITCHING: 0
NAUSEA: 0
PHOTOPHOBIA: 0
BACK PAIN: 0
SINUS PAIN: 0
APNEA: 0

## 2024-04-01 NOTE — PROGRESS NOTES
nosebleeds, postnasal drip, rhinorrhea, sinus pressure, sinus pain, sneezing, sore throat, tinnitus, trouble swallowing and voice change.    Eyes:  Negative for photophobia, pain, discharge, redness, itching and visual disturbance.   Respiratory:  Negative for apnea, cough, chest tightness, shortness of breath and wheezing.    Cardiovascular:  Negative for chest pain, palpitations and leg swelling.   Gastrointestinal:  Negative for abdominal distention, abdominal pain, blood in stool, constipation, diarrhea, nausea, rectal pain and vomiting.   Endocrine: Negative for cold intolerance, heat intolerance, polydipsia, polyphagia and polyuria.   Genitourinary:  Negative for decreased urine volume, difficulty urinating, dysuria, flank pain, frequency, genital sores, hematuria and urgency.   Musculoskeletal:  Negative for arthralgias, back pain, gait problem, joint swelling, myalgias, neck pain and neck stiffness.   Skin:  Negative for color change, rash and wound.   Allergic/Immunologic: Negative for environmental allergies and food allergies.   Neurological:  Negative for dizziness, tremors, seizures, syncope, facial asymmetry, speech difficulty, weakness, light-headedness, numbness and headaches.   Hematological:  Negative for adenopathy. Does not bruise/bleed easily.   Psychiatric/Behavioral:  Negative for agitation, confusion, decreased concentration, hallucinations, self-injury, sleep disturbance and suicidal ideas. The patient is not nervous/anxious.          Objective:   /84   Pulse 71   Resp 14   Ht 1.524 m (5')   Wt 64 kg (141 lb)   LMP  (LMP Unknown)   SpO2 96%   BMI 27.54 kg/m²     Physical Exam  Constitutional:       General: She is not in acute distress.     Appearance: She is well-developed.   HENT:      Head: Normocephalic.      Right Ear: External ear normal.      Left Ear: External ear normal.   Eyes:      Conjunctiva/sclera: Conjunctivae normal.   Neck:      Vascular: No JVD.      Trachea:

## 2024-04-02 ENCOUNTER — OFFICE VISIT (OUTPATIENT)
Dept: FAMILY MEDICINE CLINIC | Age: 64
End: 2024-04-02
Payer: MEDICARE

## 2024-04-02 VITALS
DIASTOLIC BLOOD PRESSURE: 84 MMHG | BODY MASS INDEX: 27.68 KG/M2 | RESPIRATION RATE: 14 BRPM | SYSTOLIC BLOOD PRESSURE: 134 MMHG | HEIGHT: 60 IN | HEART RATE: 71 BPM | OXYGEN SATURATION: 96 % | WEIGHT: 141 LBS

## 2024-04-02 DIAGNOSIS — F17.200 NICOTINE DEPENDENCE, UNCOMPLICATED, UNSPECIFIED NICOTINE PRODUCT TYPE: ICD-10-CM

## 2024-04-02 DIAGNOSIS — E55.9 HYPOVITAMINOSIS D: Primary | ICD-10-CM

## 2024-04-02 DIAGNOSIS — F41.9 ANXIETY: ICD-10-CM

## 2024-04-02 DIAGNOSIS — M54.31 SCIATICA OF RIGHT SIDE: ICD-10-CM

## 2024-04-02 DIAGNOSIS — E78.5 HYPERLIPIDEMIA, UNSPECIFIED HYPERLIPIDEMIA TYPE: ICD-10-CM

## 2024-04-02 DIAGNOSIS — H54.7 IMPAIRED VISION: ICD-10-CM

## 2024-04-02 DIAGNOSIS — M79.18 BUTTOCK PAIN: ICD-10-CM

## 2024-04-02 DIAGNOSIS — M25.552 PAIN OF LEFT HIP: ICD-10-CM

## 2024-04-02 PROCEDURE — 99214 OFFICE O/P EST MOD 30 MIN: CPT | Performed by: INTERNAL MEDICINE

## 2024-04-02 RX ORDER — ATORVASTATIN CALCIUM 40 MG/1
40 TABLET, FILM COATED ORAL DAILY
Qty: 30 TABLET | Refills: 3 | Status: SHIPPED | OUTPATIENT
Start: 2024-04-02 | End: 2024-04-04 | Stop reason: SDUPTHER

## 2024-04-02 RX ORDER — CHOLECALCIFEROL (VITAMIN D3) 125 MCG
TABLET ORAL
Qty: 90 TABLET | Refills: 2 | Status: SHIPPED | OUTPATIENT
Start: 2024-04-02

## 2024-04-03 DIAGNOSIS — M53.3 SACRAL PAIN: ICD-10-CM

## 2024-04-03 DIAGNOSIS — M79.18 BUTTOCK PAIN: Primary | ICD-10-CM

## 2024-04-03 DIAGNOSIS — M25.559 HIP PAIN, UNSPECIFIED LATERALITY: ICD-10-CM

## 2024-04-04 DIAGNOSIS — E78.5 HYPERLIPIDEMIA, UNSPECIFIED HYPERLIPIDEMIA TYPE: ICD-10-CM

## 2024-04-04 RX ORDER — ATORVASTATIN CALCIUM 40 MG/1
40 TABLET, FILM COATED ORAL DAILY
Qty: 100 TABLET | Refills: 3 | Status: SHIPPED | OUTPATIENT
Start: 2024-04-04

## 2024-04-15 ENCOUNTER — HOSPITAL ENCOUNTER (OUTPATIENT)
Dept: PHYSICAL THERAPY | Age: 64
Setting detail: THERAPIES SERIES
Discharge: HOME | End: 2024-04-15
Attending: INTERNAL MEDICINE
Payer: MEDICARE

## 2024-04-15 PROCEDURE — 97161 PT EVAL LOW COMPLEX 20 MIN: CPT

## 2024-04-15 PROCEDURE — 97110 THERAPEUTIC EXERCISES: CPT

## 2024-04-15 ASSESSMENT — PAIN DESCRIPTION - LOCATION: LOCATION: SACRUM;BACK

## 2024-04-15 ASSESSMENT — PAIN DESCRIPTION - PAIN TYPE: TYPE: CHRONIC PAIN

## 2024-04-15 ASSESSMENT — PAIN SCALES - GENERAL: PAINLEVEL_OUTOF10: 0

## 2024-04-15 ASSESSMENT — PAIN DESCRIPTION - DESCRIPTORS: DESCRIPTORS: SORE

## 2024-04-15 ASSESSMENT — PAIN DESCRIPTION - ORIENTATION: ORIENTATION: RIGHT;LEFT;LOWER

## 2024-04-15 NOTE — THERAPY EVALUATION
[facility]  PHYSICAL THERAPY PLAN OF CARE                                    [facility] [place] [address]     Ph: [phone]  Fax: [phone]    [] Certification  [] Recertification [x]  Plan of Care  [] Progress Note [] Discharge      Referring Provider: Orestes Whitaker MD    From:  Nerissa Kramer, PT , DPT    Patient: Yulisa Ba (63 y.o. female) : 1960 Date: 04/15/2024   Medical Diagnosis: Sciatica of right side [M54.31]    Treatment Diagnosis: LBP and sacral pain, reduced strength, decreased flexibility    Plan of Care/Certification Expiration Date: 24   Progress Report Period from: 4/15/2024  to 4/15/2024    Visits to Date: 1 No Show: 0 Cancelled Appts: 0    OBJECTIVE:   Long Term Goals - Time Frame for Long Term Goals : 4 weeks  Goals Current/ Discharge status Status   Long Term Goal 1: Patient will demonstrate >/=4/5 strength in hip for improved WBing tolerance during ADL tasks. Strength LLE  L Hip Flexion: 4/5  L Hip Extension: 4-/5  L Hip ABduction: 4-/5  L Knee Flexion: 5/5  L Knee Extension: 5/5  Strength RLE  R Hip Flexion: 4/5  R Hip Extension: 3+/5  R Hip ABduction: 4-/5  R Knee Flexion: 5/5  R Knee Extension: 5/5  New   Long Term Goal 2: Patient will report being able to tolerate rolling to side to improve sleeping mechanics. Reports difficulty sleeping due to increased pain with rolling   New   Long Term Goal 3: Patient will report avg pain of </=4/10 while laying supine to improve QOL. Pain Screening  Patient Currently in Pain: Denies  Pain Assessment: 0-10  Pain Level: 0  Best Pain Level: 0  Worst Pain Level: 9 (when laying down)  Pain Type: Chronic pain  Pain Location: Sacrum, Back  Pain Orientation: Right, Left, Lower  Pain Descriptors: Sore   New   Long Term Goal 4: Patient will improve ZEINAB to </=14/50 for improved subjective functional mobility.  New     Body Structures, Functions, Activity Limitations Requiring Skilled Therapeutic Intervention:

## 2024-04-15 NOTE — PROGRESS NOTES
[facility] Physical Therapy-  [facility]  PHYSICAL THERAPY EVALUATION    Physical Therapy: Initial Evaluation    Patient: Yulisa Ba (63 y.o.     female)   Examination Date: 04/15/2024   :  1960 ;    Confirmed: Yes MRN: [id number]  CSN: [id number]   Insurance: Payor: [facility] MEDICARE / [facility] / Product Type: Medicare /   Insurance ID: [id number] - (Medicare Managed) Secondary Insurance (if applicable):    Referring Physician: Orestes Whitaker MD       Visits to Date/Visits Approved:   (BMN)    No Show/Cancelled Appts: 0 / 0     Medical Diagnosis: Sciatica of right side [M54.31]        Treatment Diagnosis: LBP and sacral pain, reduced strength, decreased flexibility     PERTINENT MEDICAL HISTORY   Patient Assessed for Rehabilitation Services: Yes       Medical History: Chart Reviewed: Yes   Past Medical History:   Diagnosis Date    Anxiety     Cervicalgia     Chronic headaches     Chronic low back pain     Chronic low back pain     Chronic pain     Chronic scapular pain     Colon polyps     Colon polyps     COPD (chronic obstructive pulmonary disease) (HCC)     Hyperlipidemia     Insomnia     Insomnia     Lumbar radicular pain     Major depression     Nephrolithiasis     Nephrolithiasis     Osteoarthritis     Thyroid nodule     Vitamin D deficiency      Surgical History:   Past Surgical History:   Procedure Laterality Date    BREAST BIOPSY Right     benign     SECTION      COLONOSCOPY  2006    NATY HUNT MD    COLONOSCOPY N/A 2022    COLORECTAL CANCER SCREENING, HIGH RISK WITH POLYPECTOMY performed by Chucho Hunt MD at [facility]    HYSTERECTOMY (CERVIX STATUS UNKNOWN)      OVARY REMOVAL         Medications:   Current Outpatient Medications:     atorvastatin (LIPITOR) 40 MG tablet, Take 1 tablet by mouth daily, Disp: 100 tablet, Rfl: 3    Ergocalciferol (VITAMIN D2) 50 MCG (2000) TABS, Take 1 tablet orally

## 2024-04-23 ENCOUNTER — HOSPITAL ENCOUNTER (OUTPATIENT)
Dept: PHYSICAL THERAPY | Age: 64
Setting detail: THERAPIES SERIES
Discharge: HOME | End: 2024-04-23
Attending: INTERNAL MEDICINE
Payer: MEDICARE

## 2024-04-23 NOTE — PROGRESS NOTES
Therapy                            Cancellation/No-show Note    Date: 2024  Patient: Yulisa Ba (63 y.o. female)  : 1960  MRN:  [id number]  Referring Physician: Orestes Whitaker MD    Medical Diagnosis: Sciatica of right side [M54.31]      Visit Information:  Insurance: Payor: [employer] MEDICARE / Plan: [employer] MEDICARE ADVANTAGE HMO / Product Type: Medicare /   Visits to Date: 1   No Show/Cancelled Appts: 0       For today's appointment patient:  [x]  Cancelled  []  Rescheduled appointment  []  No-show   []  Called pt to remind of next appointment     Reason given by patient:  [x]  Patient ill  []  Conflicting appointment  []  No transportation    []  Conflict with work  []  No reason given  []  Other:      [x] Pt has future appointments scheduled, no follow up needed  [] Pt requests to be on hold.    Reason:   If > 2 weeks please discuss with therapist.  [] Therapist to call pt for follow up     Comments:       Signature: Electronically signed by Nerissa Kramer PT on 24 at 1:44 PM EDT

## 2024-04-30 ENCOUNTER — HOSPITAL ENCOUNTER (OUTPATIENT)
Dept: PHYSICAL THERAPY | Age: 64
Setting detail: THERAPIES SERIES
Discharge: HOME | End: 2024-04-30
Attending: INTERNAL MEDICINE

## 2024-04-30 NOTE — PROGRESS NOTES
Therapy                            Cancellation/No-show Note    Date: 2024  Patient: Yulisa Ba (63 y.o. female)  : 1960  MRN:  [id number]  Referring Physician: Orestes Whitaker MD    Medical Diagnosis: Sciatica of right side [M54.31]      Visit Information:  Insurance: Payor: [employer] MEDICARE / Plan: [employer] MEDICARE ADVANTAGE HMO / Product Type: Medicare /   Visits to Date: 1   No Show/Cancelled Appts:       For today's appointment patient:  []  Cancelled  []  Rescheduled appointment  [x]  No-show   [x]  Called pt to remind of next appointment     Reason given by patient:  []  Patient ill  []  Conflicting appointment  []  No transportation    []  Conflict with work  []  No reason given  [x]  Other:  Left VM    [x] Pt has future appointments scheduled, no follow up needed  [] Pt requests to be on hold.    Reason:   If > 2 weeks please discuss with therapist.  [] Therapist to call pt for follow up     Comments:       Signature: Electronically signed by Shanna Grullon PTA on 24 at 1:50 PM EDT

## 2024-05-02 ENCOUNTER — OFFICE VISIT (OUTPATIENT)
Dept: FAMILY MEDICINE CLINIC | Age: 64
End: 2024-05-02

## 2024-05-02 VITALS
SYSTOLIC BLOOD PRESSURE: 122 MMHG | WEIGHT: 135 LBS | HEIGHT: 60 IN | DIASTOLIC BLOOD PRESSURE: 82 MMHG | TEMPERATURE: 97.4 F | HEART RATE: 93 BPM | OXYGEN SATURATION: 95 % | BODY MASS INDEX: 26.5 KG/M2

## 2024-05-02 DIAGNOSIS — J32.0 RIGHT MAXILLARY SINUSITIS: Primary | ICD-10-CM

## 2024-05-02 DIAGNOSIS — R05.1 ACUTE COUGH: ICD-10-CM

## 2024-05-02 DIAGNOSIS — J40 BRONCHITIS: ICD-10-CM

## 2024-05-02 LAB
INFLUENZA A ANTIBODY: NORMAL
INFLUENZA B ANTIBODY: NORMAL
Lab: NORMAL
PERFORMING INSTRUMENT: NORMAL
QC PASS/FAIL: NORMAL
SARS-COV-2, POC: NORMAL

## 2024-05-02 RX ORDER — AMOXICILLIN AND CLAVULANATE POTASSIUM 875; 125 MG/1; MG/1
1 TABLET, FILM COATED ORAL 2 TIMES DAILY
Qty: 20 TABLET | Refills: 0 | Status: SHIPPED | OUTPATIENT
Start: 2024-05-02 | End: 2024-05-12

## 2024-05-02 RX ORDER — PREDNISONE 20 MG/1
40 TABLET ORAL DAILY
Qty: 10 TABLET | Refills: 0 | Status: SHIPPED | OUTPATIENT
Start: 2024-05-02 | End: 2024-05-07

## 2024-05-02 ASSESSMENT — ENCOUNTER SYMPTOMS
NAUSEA: 0
WHEEZING: 1
STRIDOR: 1
SHORTNESS OF BREATH: 1
VOMITING: 0
RHINORRHEA: 1
ABDOMINAL PAIN: 0
SINUS PRESSURE: 1
PHOTOPHOBIA: 0
COUGH: 1
EYE REDNESS: 0
SORE THROAT: 0
EYE DISCHARGE: 0
SINUS PAIN: 1
TROUBLE SWALLOWING: 0
VOICE CHANGE: 0
EYE ITCHING: 0
EYE PAIN: 0
DIARRHEA: 0
CHEST TIGHTNESS: 1

## 2024-05-02 NOTE — PROGRESS NOTES
Subjective:      Patient ID: Yulisa Ba is a 63 y.o. female who presents today for:  Chief Complaint   Patient presents with    Congestion     Patient c/o wheezing, SOB, nasal mucous in right nostril x2 weeks. Pt states she feels feverish near head/face    Eye Pain     Patient c/o eye aching, cloudy vision upon waking up for 10-15 seconds x2 weeks.     Other     Patient c/o cramping near lungs when coughing       HPI  Patient is here with c/o cough with wheezing and SOB.   She also has sinus pressure with nasal drainage for the last 2 weeks.  She is feeling feverish at times.   She has some cramping in her chest/lungs with coughing.   She also as drainage to the eye  for the last 2 weeks.  Says she is not taking anything for sx.   Says she has COPD and is smoker. She has had bronchitis in the past as well.   Past Medical History:   Diagnosis Date    Anxiety     Cervicalgia     Chronic headaches     Chronic low back pain     Chronic low back pain     Chronic pain     Chronic scapular pain     Colon polyps     Colon polyps     COPD (chronic obstructive pulmonary disease) (HCC)     Hyperlipidemia     Insomnia     Insomnia     Lumbar radicular pain     Major depression     Nephrolithiasis     Nephrolithiasis     Osteoarthritis     Thyroid nodule     Vitamin D deficiency      Past Surgical History:   Procedure Laterality Date    BREAST BIOPSY Right     benign     SECTION      COLONOSCOPY  2006    NATY HUNT MD    COLONOSCOPY N/A 2022    COLORECTAL CANCER SCREENING, HIGH RISK WITH POLYPECTOMY performed by Chucho Hunt MD at [facility]    HYSTERECTOMY (CERVIX STATUS UNKNOWN)      OVARY REMOVAL       Social History     Socioeconomic History    Marital status: Single     Spouse name: Not on file    Number of children: Not on file    Years of education: Not on file    Highest education level: Not on file   Occupational History    Not on file   Tobacco Use    Smoking status: Every Day

## 2024-05-03 ENCOUNTER — TELEPHONE (OUTPATIENT)
Dept: FAMILY MEDICINE CLINIC | Age: 64
End: 2024-05-03

## 2024-05-03 RX ORDER — DEXTROMETHORPHAN HYDROBROMIDE AND PROMETHAZINE HYDROCHLORIDE 15; 6.25 MG/5ML; MG/5ML
5 SYRUP ORAL 4 TIMES DAILY PRN
Qty: 150 ML | Refills: 0 | Status: SHIPPED | OUTPATIENT
Start: 2024-05-03 | End: 2024-05-11

## 2024-05-03 NOTE — TELEPHONE ENCOUNTER
PT was in Walk In yesterday- she is still coughing a lot. She was wanting to see of a script could be called in for her cough

## 2024-05-07 ENCOUNTER — HOSPITAL ENCOUNTER (OUTPATIENT)
Dept: PHYSICAL THERAPY | Age: 64
Setting detail: THERAPIES SERIES
Discharge: HOME | End: 2024-05-07
Attending: INTERNAL MEDICINE

## 2024-05-07 NOTE — PROGRESS NOTES
Therapy                            Cancellation/No-show Note    Date: 2024  Patient: Yulisa Ba (63 y.o. female)  : 1960  MRN:  [id number]  Referring Physician: Orestes Whitaker MD    Medical Diagnosis: Sciatica of right side [M54.31]      Visit Information:  Insurance: Payor: [employer] MEDICARE / Plan: [employer] GOLD PLUS HMO / Product Type: *No Product type* /   Visits to Date: 1   No Show/Cancelled Appts:       For today's appointment patient:  [x]  Cancelled  [x]  Rescheduled appointment  []  No-show   []  Called pt to remind of next appointment     Reason given by patient:  []  Patient ill  []  Conflicting appointment  []  No transportation    []  Conflict with work  []  No reason given  []  Other:      [x] Pt has future appointments scheduled, no follow up needed  [] Pt requests to be on hold.    Reason:   If > 2 weeks please discuss with therapist.  [] Therapist to call pt for follow up     Comments:   Patient presented 5 min late, stated she came on her lunch break and needed to leave in about 15 min to go back to work. After discussion, patient decided to reschedule appt to get max benefit out of session.     Signature: Electronically signed by Nerissa Kramer PT on 24 at 1:52 PM EDT

## 2024-05-14 ENCOUNTER — HOSPITAL ENCOUNTER (OUTPATIENT)
Dept: PHYSICAL THERAPY | Age: 64
Setting detail: THERAPIES SERIES
Discharge: HOME | End: 2024-05-14
Attending: INTERNAL MEDICINE

## 2024-05-14 NOTE — PROGRESS NOTES
Therapy                            Cancellation/No-show Note    Date: 2024  Patient: Yulisa Ba (63 y.o. female)  : 1960  MRN:  [id number]  Referring Physician: Orestes Whitaker MD    Medical Diagnosis: Sciatica of right side [M54.31]      Visit Information:  Insurance: Payor: [employer] MEDICARE / Plan: [employer] GOLD PLUS HMO / Product Type: *No Product type* /   Visits to Date: 1   No Show/Cancelled Appts: 1 / 3      For today's appointment patient:  [x]  Cancelled  []  Rescheduled appointment  []  No-show   []  Called pt to remind of next appointment     Reason given by patient:  [x]  Patient ill  []  Conflicting appointment  []  No transportation    []  Conflict with work  []  No reason given  []  Other:      [] Pt has future appointments scheduled, no follow up needed  [] Pt requests to be on hold.    Reason:   If > 2 weeks please discuss with therapist.  [x] Therapist to call pt for follow up     Comments:       Signature: Electronically signed by Nerissa Kramer PT on 24 at 8:21 AM EDT

## 2024-05-26 ENCOUNTER — HOSPITAL ENCOUNTER (EMERGENCY)
Age: 64
Discharge: HOME | End: 2024-05-26
Payer: MEDICARE

## 2024-05-26 VITALS
HEART RATE: 90 BPM | RESPIRATION RATE: 18 BRPM | SYSTOLIC BLOOD PRESSURE: 166 MMHG | HEIGHT: 60 IN | WEIGHT: 132 LBS | DIASTOLIC BLOOD PRESSURE: 92 MMHG | OXYGEN SATURATION: 98 % | TEMPERATURE: 97.9 F | BODY MASS INDEX: 25.91 KG/M2

## 2024-05-26 DIAGNOSIS — J02.9 ACUTE PHARYNGITIS, UNSPECIFIED ETIOLOGY: Primary | ICD-10-CM

## 2024-05-26 PROCEDURE — 99283 EMERGENCY DEPT VISIT LOW MDM: CPT

## 2024-05-26 RX ORDER — SULFAMETHOXAZOLE AND TRIMETHOPRIM 800; 160 MG/1; MG/1
1 TABLET ORAL 2 TIMES DAILY
Qty: 14 TABLET | Refills: 0 | Status: SHIPPED | OUTPATIENT
Start: 2024-05-26 | End: 2024-05-26

## 2024-05-26 RX ORDER — SULFAMETHOXAZOLE AND TRIMETHOPRIM 800; 160 MG/1; MG/1
1 TABLET ORAL 2 TIMES DAILY
Qty: 14 TABLET | Refills: 0 | Status: SHIPPED | OUTPATIENT
Start: 2024-05-26 | End: 2024-06-02

## 2024-05-26 ASSESSMENT — PAIN SCALES - GENERAL: PAINLEVEL_OUTOF10: 7

## 2024-05-26 ASSESSMENT — PAIN - FUNCTIONAL ASSESSMENT: PAIN_FUNCTIONAL_ASSESSMENT: 0-10

## 2024-05-26 ASSESSMENT — PAIN DESCRIPTION - PAIN TYPE: TYPE: ACUTE PAIN

## 2024-05-26 NOTE — ED TRIAGE NOTES
Patient arrived via private car due to a sore throat and sinus pressure. Patient states that she completed antibiotics and steroids about a week or so ago for an ear infection.

## 2024-05-26 NOTE — ED PROVIDER NOTES
SpO2: 98%   Weight: 59.9 kg (132 lb)   Height: 1.524 m (5')         Medical Decision Making  Patient presented to the ED with a complaint of sore throat, she states been ongoing for last 2 to 3 days for her, states she had been treated for a sinusitis approximately 20 days ago and did seem to make improvement, she states no difficulty swallowing, no fevers, no upper airway stridor, clinically patient appears to have pharyngitis, she was given a prescription for Bactrim, and advised to contact her regular family provider for follow-up, should she have any worsening change in condition, she was vies return to the ED for reevaluation.    Risk  Prescription drug management.            REASSESSMENT          CRITICAL CARE TIME   Total Critical Care time was  minutes, excluding separately reportable procedures.  There was a high probability of clinically significant/life threatening deterioration in the patient's condition which required my urgent intervention.      CONSULTS:  None    PROCEDURES:  Unless otherwise noted below, none     Procedures        FINAL IMPRESSION      1. Acute pharyngitis, unspecified etiology          DISPOSITION/PLAN   DISPOSITION Decision To Discharge 05/26/2024 08:03:18 AM      PATIENT REFERRED TO:  Orestes Whitaker MD  [address] [address]  [phone]    In 3 days        DISCHARGE MEDICATIONS:  New Prescriptions    SULFAMETHOXAZOLE-TRIMETHOPRIM (BACTRIM DS) 800-160 MG PER TABLET    Take 1 tablet by mouth 2 times daily for 7 days     Controlled Substances Monitoring:          No data to display                (Please note that portions of this note were completed with a voice recognition program.  Efforts were made to edit the dictations but occasionally words are mis-transcribed.)    Vincent Murphy PA-C (electronically signed)  Attending Emergency Physician    Supervising Attending Physician: Dr. Lopez.      Vincent Murphy PA-C  05/26/24 0807       Vincent Murphy

## 2024-06-16 ENCOUNTER — HOSPITAL ENCOUNTER (EMERGENCY)
Age: 64
Discharge: HOME | End: 2024-06-16
Attending: EMERGENCY MEDICINE
Payer: MEDICARE

## 2024-06-16 VITALS
SYSTOLIC BLOOD PRESSURE: 157 MMHG | TEMPERATURE: 97.5 F | RESPIRATION RATE: 18 BRPM | HEIGHT: 60 IN | OXYGEN SATURATION: 98 % | WEIGHT: 132 LBS | HEART RATE: 66 BPM | DIASTOLIC BLOOD PRESSURE: 97 MMHG | BODY MASS INDEX: 25.91 KG/M2

## 2024-06-16 DIAGNOSIS — K02.9 DENTAL CARIES: Primary | ICD-10-CM

## 2024-06-16 PROCEDURE — 99283 EMERGENCY DEPT VISIT LOW MDM: CPT

## 2024-06-16 PROCEDURE — 6370000000 HC RX 637 (ALT 250 FOR IP): Performed by: EMERGENCY MEDICINE

## 2024-06-16 RX ORDER — IBUPROFEN 800 MG/1
800 TABLET ORAL 2 TIMES DAILY PRN
Qty: 90 TABLET | Refills: 1 | Status: SHIPPED | OUTPATIENT
Start: 2024-06-16

## 2024-06-16 RX ORDER — AMOXICILLIN AND CLAVULANATE POTASSIUM 875; 125 MG/1; MG/1
1 TABLET, FILM COATED ORAL ONCE
Status: COMPLETED | OUTPATIENT
Start: 2024-06-16 | End: 2024-06-16

## 2024-06-16 RX ORDER — IBUPROFEN 800 MG/1
800 TABLET ORAL ONCE
Status: COMPLETED | OUTPATIENT
Start: 2024-06-16 | End: 2024-06-16

## 2024-06-16 RX ORDER — AMOXICILLIN AND CLAVULANATE POTASSIUM 875; 125 MG/1; MG/1
1 TABLET, FILM COATED ORAL 2 TIMES DAILY
Qty: 20 TABLET | Refills: 0 | Status: SHIPPED | OUTPATIENT
Start: 2024-06-16 | End: 2024-06-26

## 2024-06-16 RX ORDER — LIDOCAINE HYDROCHLORIDE 20 MG/ML
15 SOLUTION OROPHARYNGEAL
Qty: 360 ML | Refills: 0 | Status: SHIPPED | OUTPATIENT
Start: 2024-06-16 | End: 2024-06-19

## 2024-06-16 RX ORDER — LIDOCAINE HYDROCHLORIDE 20 MG/ML
15 SOLUTION OROPHARYNGEAL ONCE
Status: COMPLETED | OUTPATIENT
Start: 2024-06-16 | End: 2024-06-16

## 2024-06-16 RX ADMIN — Medication 15 ML: at 02:47

## 2024-06-16 RX ADMIN — AMOXICILLIN AND CLAVULANATE POTASSIUM 1 TABLET: 875; 125 TABLET, FILM COATED ORAL at 02:47

## 2024-06-16 RX ADMIN — IBUPROFEN 800 MG: 800 TABLET, FILM COATED ORAL at 02:47

## 2024-06-16 ASSESSMENT — PAIN DESCRIPTION - LOCATION: LOCATION: TEETH

## 2024-06-16 ASSESSMENT — PAIN - FUNCTIONAL ASSESSMENT: PAIN_FUNCTIONAL_ASSESSMENT: 0-10

## 2024-06-16 ASSESSMENT — PAIN DESCRIPTION - DESCRIPTORS: DESCRIPTORS: ACHING

## 2024-06-16 ASSESSMENT — PAIN DESCRIPTION - ORIENTATION: ORIENTATION: RIGHT;LOWER

## 2024-06-16 ASSESSMENT — PAIN DESCRIPTION - FREQUENCY: FREQUENCY: INTERMITTENT

## 2024-06-16 ASSESSMENT — PAIN DESCRIPTION - PAIN TYPE: TYPE: ACUTE PAIN

## 2024-06-16 ASSESSMENT — PAIN SCALES - GENERAL: PAINLEVEL_OUTOF10: 7

## 2024-06-16 NOTE — DISCHARGE INSTRUCTIONS
We would recommend that you follow-up with a dentist as soon as possible for further evaluation given dental cavities.  We would recommend you follow-up with emergency room for any worsening facial swelling, bleeding gums, tongue swelling, shortness of breath, stridor or any other concern

## 2024-06-16 NOTE — ED PROVIDER NOTES
with the below findings:        Interpretation per the Radiologist below, if available at the time of this note:    No orders to display         ED BEDSIDE ULTRASOUND:   Performed by ED Physician - none    LABS:  Labs Reviewed - No data to display    All other labs were within normal range or not returned as of this dictation.    EMERGENCY DEPARTMENT COURSE and DIFFERENTIAL DIAGNOSIS/MDM:   Vitals:    Vitals:    06/16/24 0215   BP: (!) 157/97   Pulse: 66   Resp: 18   Temp: 97.5 °F (36.4 °C)   SpO2: 98%   Weight: 59.9 kg (132 lb)   Height: 1.524 m (5')           Medical Decision Making  PT has noted dental caries and teeth #28, #29, #30 and I have written for ibuprofen 800 mg po x 1, Augmentin 875 mg po x 1, and 15 mL of viscous lidocaine to swish and spit.  Patient be discharged home with the same.  At this time she has no obvious signs of dental abscess advised that she requires follow-up with a dentist in the next 1 to 2 days.  She currently has no obvious signs or symptoms of liquids angina and has no respiratory complaints and no tongue swelling.  We have recommended follow-up with emergency room for any worsening facial swelling, fevers, nausea, vomiting, pus draining from the gum, worsening tooth pain or any other concerns    Risk  Prescription drug management.            REASSESSMENT          CRITICAL CARE TIME   Total Critical Care time was 0 minutes, excluding separately reportable procedures.  There was a high probability of clinically significant/life threatening deterioration in the patient's condition which required my urgent intervention.      CONSULTS:  None    PROCEDURES:  Unless otherwise noted below, none     Procedures        FINAL IMPRESSION      1. Dental caries          DISPOSITION/PLAN   DISPOSITION Decision To Discharge 06/16/2024 02:42:29 AM      PATIENT REFERRED TO:  [facility]  [address] [place] [address]  [phone]  In 1 day        DISCHARGE

## 2024-08-16 NOTE — PROGRESS NOTES
Subjective:      Patient ID: Yulisa Ba is a 64 y.o. female New patient, here for evaluation of the following chief complaint(s):  Chief Complaint   Patient presents with    Follow-up    Blurred Vision     Pt states she has been getting blurry vision about twice a month       HPI   Chest pain: bilateral, 7/10, lasting 30-40 seconds, no associated dsypnea.         Left sacral pain: The patient has been experiencing left sacral and hip pain on a daily basis.  She characterizes it as constant nonradiating not relieved by over-the-counter analgesics.  She is also experiencing mild discomfort radiating down her leg to her mid thigh.        Anxiety:Pt states that her symptoms are well controlled at this time.  Compliant 20 mg po daily .       2/25/2022-fell    Impaired vision: The patient has been informed that she has glaucoma.  She was previously referred to ophthalmology      Hyperlipidemia: pt was noted to have hyperlipidemia.  Cholesterol profile on 3/10/2022:  Component      Latest Ref Rng & Units 3/10/2022          10:09 AM   CHOLESTEROL, TOTAL, 145501      0 - 199 mg/dL 271 (H)   Triglycerides      0 - 150 mg/dL 65   HDL Cholesterol      40 - 59 mg/dL 67 (H)   LDL Calculated      0 - 129 mg/dL 191 (H)   Compliant with lipitor 40 mg daily.      Hypovitaminosis D: The patient previously was noted to have hypovitaminosis D.  She did not begin vitamin D replacement therapy.        Nicotine dependence: The patient engages in daily nicotine use.  She is interested in achieving smoking cessation.    At present he denies polyuria,  Polydipsia, constitutional, sinus, visual, cardiopulmonary, urologic, gastrointestinal, immunologic/hematologic, musculoskeletal, neurologic,dermatologic, or psychiatric complaints.    Current Outpatient Medications on File Prior to Visit   Medication Sig Dispense Refill    ibuprofen (ADVIL;MOTRIN) 800 MG tablet Take 1 tablet by mouth 2 times daily as needed for Pain 90 tablet 1

## 2024-08-20 ENCOUNTER — OFFICE VISIT (OUTPATIENT)
Dept: FAMILY MEDICINE CLINIC | Age: 64
End: 2024-08-20

## 2024-08-20 VITALS
SYSTOLIC BLOOD PRESSURE: 132 MMHG | RESPIRATION RATE: 14 BRPM | OXYGEN SATURATION: 98 % | WEIGHT: 131 LBS | HEART RATE: 72 BPM | HEIGHT: 60 IN | BODY MASS INDEX: 25.72 KG/M2 | DIASTOLIC BLOOD PRESSURE: 80 MMHG

## 2024-08-20 DIAGNOSIS — Z12.31 ENCOUNTER FOR SCREENING MAMMOGRAM FOR MALIGNANT NEOPLASM OF BREAST: ICD-10-CM

## 2024-08-20 DIAGNOSIS — Z12.39 ENCOUNTER FOR SCREENING FOR MALIGNANT NEOPLASM OF BREAST, UNSPECIFIED SCREENING MODALITY: ICD-10-CM

## 2024-08-20 DIAGNOSIS — F17.200 NICOTINE DEPENDENCE, UNCOMPLICATED, UNSPECIFIED NICOTINE PRODUCT TYPE: ICD-10-CM

## 2024-08-20 DIAGNOSIS — Z87.891 PERSONAL HISTORY OF TOBACCO USE: ICD-10-CM

## 2024-08-20 DIAGNOSIS — F41.9 ANXIETY: Primary | ICD-10-CM

## 2024-08-20 DIAGNOSIS — R07.9 CHEST PAIN, UNSPECIFIED TYPE: ICD-10-CM

## 2024-08-20 DIAGNOSIS — E55.9 HYPOVITAMINOSIS D: ICD-10-CM

## 2024-08-20 DIAGNOSIS — E78.5 HYPERLIPIDEMIA, UNSPECIFIED HYPERLIPIDEMIA TYPE: ICD-10-CM

## 2024-08-20 RX ORDER — ERGOCALCIFEROL 1.25 MG/1
50000 CAPSULE ORAL WEEKLY
Qty: 8 CAPSULE | Refills: 0 | Status: SHIPPED | OUTPATIENT
Start: 2024-08-20 | End: 2024-10-09

## 2024-08-20 RX ORDER — ATORVASTATIN CALCIUM 40 MG/1
40 TABLET, FILM COATED ORAL DAILY
Qty: 100 TABLET | Refills: 3 | Status: SHIPPED | OUTPATIENT
Start: 2024-08-20

## 2024-08-20 NOTE — PATIENT INSTRUCTIONS
follow-up, he or she will help you understand what to do next.  After a lung cancer screening, you can go back to your usual activities right away.  A lung cancer screening test can't tell if you have lung cancer. If your results are positive, your doctor can't tell whether an abnormal finding is a harmless nodule, cancer, or something else without doing more tests.  What can you do to help prevent lung cancer?  Some lung cancers can't be prevented. But if you smoke, quitting smoking is the best step you can take to prevent lung cancer. If you want to quit, your doctor can recommend medicines or other ways to help.  Follow-up care is a key part of your treatment and safety. Be sure to make and go to all appointments, and call your doctor if you are having problems. It's also a good idea to know your test results and keep a list of the medicines you take.  Where can you learn more?  Go to https://www.in[employer]in[employer].net/patientEd and enter Q940 to learn more about \"Learning About Lung Cancer Screening.\"  Current as of: October 25, 2023  Content Version: 14.1  © 3331-3996 [employer].   Care instructions adapted under license by [employer]. If you have questions about a medical condition or this instruction, always ask your healthcare professional. [employer] disclaims any warranty or liability for your use of this information.

## 2024-08-21 DIAGNOSIS — E55.9 HYPOVITAMINOSIS D: Primary | ICD-10-CM

## 2024-08-21 LAB — VITAMIN D 25-HYDROXY: 22.6 NG/ML (ref 30–100)

## 2024-09-04 NOTE — PROGRESS NOTES
Pt arrives via walk in with guest c/o dehydration.  Pt stated, \"I think I'm dehydrated. I'm not able to keep anything down for the past couple days.  No diarrhea just vomiting.  And now I'm weak.\"  Denies pain.   name: N/A    Number of children: N/A    Years of education: N/A     Occupational History    Not on file. Social History Main Topics    Smoking status: Current Every Day Smoker     Packs/day: 0.50     Years: 30.00     Types: Cigarettes    Smokeless tobacco: Never Used      Comment: trying to cut back    Alcohol use Yes      Comment: ocassional    Drug use: No    Sexual activity: Not on file     Other Topics Concern    Not on file     Social History Narrative    No narrative on file     Current Outpatient Prescriptions on File Prior to Visit   Medication Sig Dispense Refill    ALPRAZolam (XANAX) 0.5 MG tablet   0     No current facility-administered medications on file prior to visit. Allergies:  Review of patient's allergies indicates no known allergies. Review of Systems   Constitutional: Negative for activity change, appetite change and fatigue. Respiratory: Negative for apnea, cough, chest tightness and shortness of breath. Cardiovascular: Negative for chest pain, palpitations and leg swelling. Gastrointestinal: Negative for abdominal pain, blood in stool, constipation, diarrhea, nausea and vomiting. Musculoskeletal: Negative for arthralgias and myalgias. Neurological: Negative for focal weakness, seizures and headaches. Psychiatric/Behavioral: Positive for dysphoric mood (recent passing of son). Negative for hallucinations, sleep disturbance and suicidal ideas. Objective:   /74 (Site: Right Arm, Position: Sitting, Cuff Size: Large Adult)   Pulse 74   Temp 98.6 °F (37 °C) (Temporal)   Resp 20   Ht 5' 1\" (1.549 m)   Wt 133 lb (60.3 kg)   SpO2 98%   Breastfeeding? No   BMI 25.13 kg/m²     Physical Exam   Constitutional: She is oriented to person, place, and time. She appears well-developed and well-nourished. No distress. HENT:   Head: Normocephalic and atraumatic. Eyes: Conjunctivae and EOM are normal. Pupils are equal, round, and reactive to light. clears/2/21 dinner

## 2024-10-02 ENCOUNTER — TELEPHONE (OUTPATIENT)
Dept: FAMILY MEDICINE CLINIC | Age: 64
End: 2024-10-02

## 2024-10-02 RX ORDER — ERGOCALCIFEROL 1.25 MG/1
50000 CAPSULE, LIQUID FILLED ORAL WEEKLY
Qty: 8 CAPSULE | Refills: 0 | Status: SHIPPED | OUTPATIENT
Start: 2024-10-02 | End: 2024-11-21

## 2024-10-02 NOTE — TELEPHONE ENCOUNTER
I spoke to the patient because we received some paperwork that she needs to sign for her employment.

## 2025-02-14 ENCOUNTER — TELEMEDICINE (OUTPATIENT)
Age: 65
End: 2025-02-14
Payer: MEDICARE

## 2025-02-14 DIAGNOSIS — Z00.00 MEDICARE ANNUAL WELLNESS VISIT, SUBSEQUENT: Primary | ICD-10-CM

## 2025-02-14 PROCEDURE — G0439 PPPS, SUBSEQ VISIT: HCPCS | Performed by: NURSE PRACTITIONER

## 2025-02-14 PROCEDURE — 3017F COLORECTAL CA SCREEN DOC REV: CPT | Performed by: NURSE PRACTITIONER

## 2025-02-14 SDOH — ECONOMIC STABILITY: FOOD INSECURITY: WITHIN THE PAST 12 MONTHS, THE FOOD YOU BOUGHT JUST DIDN'T LAST AND YOU DIDN'T HAVE MONEY TO GET MORE.: NEVER TRUE

## 2025-02-14 SDOH — ECONOMIC STABILITY: FOOD INSECURITY: WITHIN THE PAST 12 MONTHS, YOU WORRIED THAT YOUR FOOD WOULD RUN OUT BEFORE YOU GOT MONEY TO BUY MORE.: NEVER TRUE

## 2025-02-14 ASSESSMENT — LIFESTYLE VARIABLES
HOW OFTEN DO YOU HAVE A DRINK CONTAINING ALCOHOL: MONTHLY OR LESS
HOW MANY STANDARD DRINKS CONTAINING ALCOHOL DO YOU HAVE ON A TYPICAL DAY: 1 OR 2

## 2025-02-14 ASSESSMENT — PATIENT HEALTH QUESTIONNAIRE - PHQ9
SUM OF ALL RESPONSES TO PHQ QUESTIONS 1-9: 0
4. FEELING TIRED OR HAVING LITTLE ENERGY: NOT AT ALL
8. MOVING OR SPEAKING SO SLOWLY THAT OTHER PEOPLE COULD HAVE NOTICED. OR THE OPPOSITE, BEING SO FIGETY OR RESTLESS THAT YOU HAVE BEEN MOVING AROUND A LOT MORE THAN USUAL: NOT AT ALL
2. FEELING DOWN, DEPRESSED OR HOPELESS: NOT AT ALL
7. TROUBLE CONCENTRATING ON THINGS, SUCH AS READING THE NEWSPAPER OR WATCHING TELEVISION: NOT AT ALL
SUM OF ALL RESPONSES TO PHQ QUESTIONS 1-9: 0
SUM OF ALL RESPONSES TO PHQ QUESTIONS 1-9: 0
6. FEELING BAD ABOUT YOURSELF - OR THAT YOU ARE A FAILURE OR HAVE LET YOURSELF OR YOUR FAMILY DOWN: NOT AT ALL
9. THOUGHTS THAT YOU WOULD BE BETTER OFF DEAD, OR OF HURTING YOURSELF: NOT AT ALL
5. POOR APPETITE OR OVEREATING: NOT AT ALL
SUM OF ALL RESPONSES TO PHQ9 QUESTIONS 1 & 2: 0
1. LITTLE INTEREST OR PLEASURE IN DOING THINGS: NOT AT ALL
3. TROUBLE FALLING OR STAYING ASLEEP: NOT AT ALL
SUM OF ALL RESPONSES TO PHQ QUESTIONS 1-9: 0

## 2025-02-14 NOTE — PROGRESS NOTES
Medicare Annual Wellness Visit    Yulisa Ba is here for Medicare AWV    Assessment & Plan   Medicare annual wellness visit, subsequent       Return in 1 year (on 2/14/2026) for Medicare AWV.     Subjective       Patient's complete Health Risk Assessment and screening values have been reviewed and are found in Flowsheets. The following problems were reviewed today and where indicated follow up appointments were made and/or referrals ordered.    Positive Risk Factor Screenings with Interventions:        Drug Use:   Substance and Sexual Activity   Drug Use Yes    Types: Marijuana (Weed)    Comment: daily     Interventions:  Patient declined any further intervention or treatment              Vision Screen:  Do you have difficulty driving, watching TV, or doing any of your daily activities because of your eyesight?: No  Have you had an eye exam within the past year?: (!) No  Interventions:   Patient encouraged to make appointment with their eye specialist      Advanced Directives:  Do you have a Living Will?: (!) No    Intervention:  has NO advanced directive - not interested in additional information        Tobacco Use:    Tobacco Use      Smoking status: Every Day        Packs/day: 1.00        Years: 1 pack/day for 30.0 years (30.0 ttl pk-yrs)        Types: Cigarettes      Smokeless tobacco: Never      Tobacco comments: trying to cut back     Interventions:  Patient declined any further intervention or treatment                      Objective    Patient-Reported Vitals  No data recorded             No Known Allergies  Prior to Visit Medications    Medication Sig Taking? Authorizing Provider   vitamin D (ERGOCALCIFEROL) 1.25 MG (17169 UT) CAPS capsule Take 1 capsule by mouth once a week for 8 doses  Orestes Whitaker MD   atorvastatin (LIPITOR) 40 MG tablet Take 1 tablet by mouth daily  Orestes Whitaker MD   albuterol (PROVENTIL) (5 MG/ML) 0.5% nebulizer solution Take 1 mL by nebulization 4 times daily as needed

## 2025-02-14 NOTE — PATIENT INSTRUCTIONS

## (undated) DEVICE — TUBE SET 96 MM 64 MM H2O PERISTALTIC STD AUX CHANNEL

## (undated) DEVICE — ENDO CARRY-ON PROCEDURE KIT: Brand: ENDO CARRY-ON PROCEDURE KIT

## (undated) DEVICE — TUBING, SUCTION, 1/4" X 10', STRAIGHT: Brand: MEDLINE

## (undated) DEVICE — FORCEPS BX L240CM JAW DIA2.8MM L CAP W/ NDL MIC MESH TOOTH

## (undated) DEVICE — Device: Brand: ENDO SMARTCAP

## (undated) DEVICE — BRUSH ENDO CLN L90.5IN SHTH DIA1.7MM BRIST DIA5-7MM 2-6MM

## (undated) DEVICE — SINGLE PORT MANIFOLD: Brand: NEPTUNE 2